# Patient Record
Sex: FEMALE | Race: WHITE | Employment: OTHER | URBAN - METROPOLITAN AREA
[De-identification: names, ages, dates, MRNs, and addresses within clinical notes are randomized per-mention and may not be internally consistent; named-entity substitution may affect disease eponyms.]

---

## 2017-03-15 PROBLEM — S72.141A CLOSED INTERTROCHANTERIC FRACTURE OF RIGHT FEMUR (HCC): Status: ACTIVE | Noted: 2017-03-15

## 2017-04-06 ENCOUNTER — OFFICE VISIT (OUTPATIENT)
Dept: ORTHOPEDIC SURGERY | Age: 82
End: 2017-04-06

## 2017-04-06 ENCOUNTER — TELEPHONE (OUTPATIENT)
Dept: ORTHOPEDIC SURGERY | Age: 82
End: 2017-04-06

## 2017-04-06 VITALS
HEART RATE: 75 BPM | DIASTOLIC BLOOD PRESSURE: 74 MMHG | WEIGHT: 141.98 LBS | SYSTOLIC BLOOD PRESSURE: 121 MMHG | HEIGHT: 67 IN | BODY MASS INDEX: 22.28 KG/M2

## 2017-04-06 DIAGNOSIS — M25.551 HIP PAIN, RIGHT: Primary | ICD-10-CM

## 2017-04-06 DIAGNOSIS — S72.141A CLOSED INTERTROCHANTERIC FRACTURE OF RIGHT FEMUR, INITIAL ENCOUNTER (HCC): ICD-10-CM

## 2017-04-06 PROCEDURE — 99024 POSTOP FOLLOW-UP VISIT: CPT | Performed by: ORTHOPAEDIC SURGERY

## 2017-04-06 PROCEDURE — 73502 X-RAY EXAM HIP UNI 2-3 VIEWS: CPT | Performed by: ORTHOPAEDIC SURGERY

## 2017-04-06 RX ORDER — OXYCODONE HYDROCHLORIDE AND ACETAMINOPHEN 5; 325 MG/1; MG/1
1 TABLET ORAL EVERY 8 HOURS PRN
Qty: 30 TABLET | Refills: 0 | Status: SHIPPED | OUTPATIENT
Start: 2017-04-06 | End: 2017-04-13

## 2017-05-04 ENCOUNTER — OFFICE VISIT (OUTPATIENT)
Dept: ORTHOPEDIC SURGERY | Age: 82
End: 2017-05-04

## 2017-05-04 DIAGNOSIS — S72.141S CLOSED INTERTROCHANTERIC FRACTURE OF RIGHT FEMUR, SEQUELA: ICD-10-CM

## 2017-05-04 DIAGNOSIS — M25.551 HIP PAIN, RIGHT: Primary | ICD-10-CM

## 2017-05-04 PROCEDURE — 99024 POSTOP FOLLOW-UP VISIT: CPT | Performed by: ORTHOPAEDIC SURGERY

## 2017-05-04 PROCEDURE — 73502 X-RAY EXAM HIP UNI 2-3 VIEWS: CPT | Performed by: ORTHOPAEDIC SURGERY

## 2017-05-25 PROBLEM — R42 VERTIGO: Status: ACTIVE | Noted: 2017-05-25

## 2017-06-15 ENCOUNTER — OFFICE VISIT (OUTPATIENT)
Dept: ORTHOPEDIC SURGERY | Age: 82
End: 2017-06-15

## 2017-06-15 VITALS — HEIGHT: 67 IN | WEIGHT: 141.98 LBS | BODY MASS INDEX: 22.28 KG/M2

## 2017-06-15 DIAGNOSIS — S72.141D CLOSED INTERTROCHANTERIC FRACTURE OF RIGHT FEMUR, WITH ROUTINE HEALING, SUBSEQUENT ENCOUNTER: ICD-10-CM

## 2017-06-15 DIAGNOSIS — M25.551 RIGHT HIP PAIN: Primary | ICD-10-CM

## 2017-06-15 PROCEDURE — 73502 X-RAY EXAM HIP UNI 2-3 VIEWS: CPT | Performed by: ORTHOPAEDIC SURGERY

## 2017-06-15 PROCEDURE — 99024 POSTOP FOLLOW-UP VISIT: CPT | Performed by: ORTHOPAEDIC SURGERY

## 2018-08-26 ENCOUNTER — HOSPITAL ENCOUNTER (INPATIENT)
Age: 83
LOS: 4 days | Discharge: HOME HEALTH | DRG: 391 | End: 2018-08-31
Attending: EMERGENCY MEDICINE | Admitting: INTERNAL MEDICINE
Payer: MEDICARE

## 2018-08-26 ENCOUNTER — APPOINTMENT (OUTPATIENT)
Dept: GENERAL RADIOLOGY | Age: 83
DRG: 391 | End: 2018-08-26
Payer: MEDICARE

## 2018-08-26 DIAGNOSIS — E86.0 DEHYDRATION: Primary | ICD-10-CM

## 2018-08-26 DIAGNOSIS — E87.1 HYPONATREMIA: ICD-10-CM

## 2018-08-26 PROBLEM — R53.1 GENERAL WEAKNESS: Status: ACTIVE | Noted: 2018-08-26

## 2018-08-26 LAB
ALBUMIN SERPL-MCNC: 3.8 G/DL (ref 3.4–5)
ALP BLD-CCNC: 62 U/L (ref 40–129)
ALT SERPL-CCNC: 15 U/L (ref 10–40)
ANION GAP SERPL CALCULATED.3IONS-SCNC: 13 MMOL/L (ref 3–16)
ANION GAP SERPL CALCULATED.3IONS-SCNC: 9 MMOL/L (ref 3–16)
APTT: 29 SEC (ref 26–36)
AST SERPL-CCNC: 28 U/L (ref 15–37)
BACTERIA: ABNORMAL /HPF
BASOPHILS ABSOLUTE: 0 K/UL (ref 0–0.2)
BASOPHILS RELATIVE PERCENT: 0.2 %
BILIRUB SERPL-MCNC: 0.5 MG/DL (ref 0–1)
BILIRUBIN DIRECT: <0.2 MG/DL (ref 0–0.3)
BILIRUBIN URINE: NEGATIVE
BILIRUBIN, INDIRECT: NORMAL MG/DL (ref 0–1)
BLOOD, URINE: ABNORMAL
BUN BLDV-MCNC: 17 MG/DL (ref 7–20)
BUN BLDV-MCNC: 17 MG/DL (ref 7–20)
CALCIUM SERPL-MCNC: 8.3 MG/DL (ref 8.3–10.6)
CALCIUM SERPL-MCNC: 9.2 MG/DL (ref 8.3–10.6)
CASTS: ABNORMAL /LPF
CHLORIDE BLD-SCNC: 86 MMOL/L (ref 99–110)
CHLORIDE BLD-SCNC: 91 MMOL/L (ref 99–110)
CLARITY: CLEAR
CO2: 23 MMOL/L (ref 21–32)
CO2: 24 MMOL/L (ref 21–32)
COLOR: YELLOW
CREAT SERPL-MCNC: 1.1 MG/DL (ref 0.6–1.2)
CREAT SERPL-MCNC: 1.3 MG/DL (ref 0.6–1.2)
EOSINOPHILS ABSOLUTE: 0 K/UL (ref 0–0.6)
EOSINOPHILS RELATIVE PERCENT: 0 %
EPITHELIAL CELLS, UA: ABNORMAL /HPF
GFR AFRICAN AMERICAN: 47
GFR AFRICAN AMERICAN: 58
GFR NON-AFRICAN AMERICAN: 39
GFR NON-AFRICAN AMERICAN: 48
GLUCOSE BLD-MCNC: 107 MG/DL (ref 70–99)
GLUCOSE BLD-MCNC: 96 MG/DL (ref 70–99)
GLUCOSE URINE: NEGATIVE MG/DL
HCT VFR BLD CALC: 35.9 % (ref 36–48)
HEMOGLOBIN: 12.9 G/DL (ref 12–16)
INR BLD: 1.42 (ref 0.86–1.14)
KETONES, URINE: NEGATIVE MG/DL
LACTIC ACID: 1.5 MMOL/L (ref 0.4–2)
LEUKOCYTE ESTERASE, URINE: NEGATIVE
LIPASE: 89 U/L (ref 13–60)
LYMPHOCYTES ABSOLUTE: 0.4 K/UL (ref 1–5.1)
LYMPHOCYTES RELATIVE PERCENT: 8.2 %
MCH RBC QN AUTO: 33.1 PG (ref 26–34)
MCHC RBC AUTO-ENTMCNC: 36 G/DL (ref 31–36)
MCV RBC AUTO: 91.9 FL (ref 80–100)
MICROSCOPIC EXAMINATION: YES
MONOCYTES ABSOLUTE: 0.1 K/UL (ref 0–1.3)
MONOCYTES RELATIVE PERCENT: 3.4 %
NEUTROPHILS ABSOLUTE: 3.9 K/UL (ref 1.7–7.7)
NEUTROPHILS RELATIVE PERCENT: 88.2 %
NITRITE, URINE: NEGATIVE
PDW BLD-RTO: 14.1 % (ref 12.4–15.4)
PH UA: 5.5
PLATELET # BLD: 159 K/UL (ref 135–450)
PMV BLD AUTO: 7.6 FL (ref 5–10.5)
POTASSIUM REFLEX MAGNESIUM: 3.9 MMOL/L (ref 3.5–5.1)
POTASSIUM REFLEX MAGNESIUM: 4.1 MMOL/L (ref 3.5–5.1)
PROTEIN UA: ABNORMAL MG/DL
PROTHROMBIN TIME: 16.2 SEC (ref 9.8–13)
RBC # BLD: 3.91 M/UL (ref 4–5.2)
RBC UA: ABNORMAL /HPF (ref 0–2)
SODIUM BLD-SCNC: 122 MMOL/L (ref 136–145)
SODIUM BLD-SCNC: 124 MMOL/L (ref 136–145)
SPECIFIC GRAVITY UA: <=1.005
TOTAL PROTEIN: 6.9 G/DL (ref 6.4–8.2)
TROPONIN: <0.01 NG/ML
URINE TYPE: ABNORMAL
UROBILINOGEN, URINE: 0.2 E.U./DL
WBC # BLD: 4.4 K/UL (ref 4–11)
WBC UA: ABNORMAL /HPF (ref 0–5)

## 2018-08-26 PROCEDURE — 93010 ELECTROCARDIOGRAM REPORT: CPT | Performed by: INTERNAL MEDICINE

## 2018-08-26 PROCEDURE — 93005 ELECTROCARDIOGRAM TRACING: CPT | Performed by: PHYSICIAN ASSISTANT

## 2018-08-26 PROCEDURE — 80048 BASIC METABOLIC PNL TOTAL CA: CPT

## 2018-08-26 PROCEDURE — 2580000003 HC RX 258: Performed by: PHYSICIAN ASSISTANT

## 2018-08-26 PROCEDURE — 80076 HEPATIC FUNCTION PANEL: CPT

## 2018-08-26 PROCEDURE — G0378 HOSPITAL OBSERVATION PER HR: HCPCS

## 2018-08-26 PROCEDURE — 85025 COMPLETE CBC W/AUTO DIFF WBC: CPT

## 2018-08-26 PROCEDURE — 85610 PROTHROMBIN TIME: CPT

## 2018-08-26 PROCEDURE — 96361 HYDRATE IV INFUSION ADD-ON: CPT

## 2018-08-26 PROCEDURE — 85730 THROMBOPLASTIN TIME PARTIAL: CPT

## 2018-08-26 PROCEDURE — 2580000003 HC RX 258: Performed by: INTERNAL MEDICINE

## 2018-08-26 PROCEDURE — 36415 COLL VENOUS BLD VENIPUNCTURE: CPT

## 2018-08-26 PROCEDURE — 99285 EMERGENCY DEPT VISIT HI MDM: CPT

## 2018-08-26 PROCEDURE — 71045 X-RAY EXAM CHEST 1 VIEW: CPT

## 2018-08-26 PROCEDURE — 81001 URINALYSIS AUTO W/SCOPE: CPT

## 2018-08-26 PROCEDURE — 6370000000 HC RX 637 (ALT 250 FOR IP): Performed by: INTERNAL MEDICINE

## 2018-08-26 PROCEDURE — 6360000002 HC RX W HCPCS: Performed by: PHYSICIAN ASSISTANT

## 2018-08-26 PROCEDURE — 84484 ASSAY OF TROPONIN QUANT: CPT

## 2018-08-26 PROCEDURE — 83605 ASSAY OF LACTIC ACID: CPT

## 2018-08-26 PROCEDURE — 83690 ASSAY OF LIPASE: CPT

## 2018-08-26 RX ORDER — HYDROXYUREA 500 MG/1
500 CAPSULE ORAL DAILY
Status: CANCELLED | OUTPATIENT
Start: 2018-08-27

## 2018-08-26 RX ORDER — SODIUM CHLORIDE 0.9 % (FLUSH) 0.9 %
10 SYRINGE (ML) INJECTION EVERY 12 HOURS SCHEDULED
Status: DISCONTINUED | OUTPATIENT
Start: 2018-08-26 | End: 2018-08-31 | Stop reason: HOSPADM

## 2018-08-26 RX ORDER — 0.9 % SODIUM CHLORIDE 0.9 %
1000 INTRAVENOUS SOLUTION INTRAVENOUS ONCE
Status: COMPLETED | OUTPATIENT
Start: 2018-08-26 | End: 2018-08-26

## 2018-08-26 RX ORDER — TRAMADOL HYDROCHLORIDE 50 MG/1
50 TABLET ORAL EVERY 6 HOURS PRN
Status: DISCONTINUED | OUTPATIENT
Start: 2018-08-26 | End: 2018-08-31 | Stop reason: HOSPADM

## 2018-08-26 RX ORDER — SODIUM CHLORIDE 9 MG/ML
INJECTION, SOLUTION INTRAVENOUS CONTINUOUS
Status: DISPENSED | OUTPATIENT
Start: 2018-08-26 | End: 2018-08-27

## 2018-08-26 RX ORDER — GABAPENTIN 600 MG/1
600 TABLET ORAL 2 TIMES DAILY
Status: CANCELLED | OUTPATIENT
Start: 2018-08-26

## 2018-08-26 RX ORDER — SODIUM CHLORIDE 0.9 % (FLUSH) 0.9 %
10 SYRINGE (ML) INJECTION PRN
Status: DISCONTINUED | OUTPATIENT
Start: 2018-08-26 | End: 2018-08-31 | Stop reason: HOSPADM

## 2018-08-26 RX ORDER — GABAPENTIN 300 MG/1
600 CAPSULE ORAL 2 TIMES DAILY
Status: DISCONTINUED | OUTPATIENT
Start: 2018-08-26 | End: 2018-08-31 | Stop reason: HOSPADM

## 2018-08-26 RX ORDER — ONDANSETRON 2 MG/ML
4 INJECTION INTRAMUSCULAR; INTRAVENOUS ONCE
Status: COMPLETED | OUTPATIENT
Start: 2018-08-26 | End: 2018-08-26

## 2018-08-26 RX ORDER — HYDROXYUREA 500 MG/1
500 CAPSULE ORAL DAILY
Status: DISCONTINUED | OUTPATIENT
Start: 2018-08-27 | End: 2018-08-31 | Stop reason: HOSPADM

## 2018-08-26 RX ORDER — TRAMADOL HYDROCHLORIDE 50 MG/1
50 TABLET ORAL 2 TIMES DAILY PRN
Status: CANCELLED | OUTPATIENT
Start: 2018-08-26

## 2018-08-26 RX ORDER — HYDROXYUREA 500 MG/1
500 CAPSULE ORAL DAILY
COMMUNITY

## 2018-08-26 RX ORDER — ONDANSETRON 2 MG/ML
4 INJECTION INTRAMUSCULAR; INTRAVENOUS EVERY 6 HOURS PRN
Status: DISCONTINUED | OUTPATIENT
Start: 2018-08-26 | End: 2018-08-31 | Stop reason: HOSPADM

## 2018-08-26 RX ADMIN — SODIUM CHLORIDE: 9 INJECTION, SOLUTION INTRAVENOUS at 16:00

## 2018-08-26 RX ADMIN — GABAPENTIN 600 MG: 300 CAPSULE ORAL at 21:09

## 2018-08-26 RX ADMIN — SODIUM CHLORIDE 1000 ML: 9 INJECTION, SOLUTION INTRAVENOUS at 12:47

## 2018-08-26 RX ADMIN — SODIUM CHLORIDE 1000 ML: 9 INJECTION, SOLUTION INTRAVENOUS at 11:56

## 2018-08-26 RX ADMIN — APIXABAN 5 MG: 5 TABLET, FILM COATED ORAL at 17:12

## 2018-08-26 RX ADMIN — ONDANSETRON 4 MG: 2 SOLUTION INTRAMUSCULAR; INTRAVENOUS at 11:56

## 2018-08-26 NOTE — ED PROVIDER NOTES
Emergency Department Provider Note     Location: [address] [place] [address]  ED  8/26/2018    I independently performed a history and physical on Wyatt Small 429. All diagnostic, treatment, and disposition decisions were made by myself in conjunction with the mid-level provider. Briefly, this is a 81 y.o. female here for fatigue, on chemo pill for essential thrombocytosis, having fever at home. Sees Dr. Dhruv Hussein, on eliquis, hx DVT, had some N/V/D this week as well, w/ sick contacts. No SOB, no cough/sputum. ED Triage Vitals [08/26/18 1116]   BP Temp Temp src Pulse Resp SpO2 Height Weight   96/61 97.6 °F (36.4 °C) -- 86 14 95 % -- 146 lb (66.2 kg)        Exam showed no acute distress, appears fatigued, A&Ox4, heart RRR, no M/G/R, lungs CTAB, resp. Nonlabored, no abd tenderness, no peritoneal signs/no rebound/no guarding, no focal neuro deficits    EKG interpreted by me shows:  normal sinus rhythm with a rate of 81  Axis is   Left axis deviation  QTc is  within an acceptable range  Intervals and Durations are unremarkable. ST Segments: normal  No significant change from prior EKG dated 3-15-17      For further details of [address] [place] [address] emergency department encounter, please see JOSELUIS Leung's documentation. I reviewed labs/imaging, discussed w/ patient, I do not believe the CXR represents PNA, no resp. Sx, afebrile here, potentially dehydrated from recent GI illness, hypovolemic, BP a little low, given fluids. zofran improved nausea. UA still pending but no urinary sx. PA spoke to hospitalist team for admission. Pt verbalized understanding of plan to stay in hospital.  Clinical Impression:  1. Dehydration    2. Hyponatremia      Disposition:  Patient admitted to the hospital in stable condition.     This chart was generated in part by using Dragon Dictation system and may contain errors related to that system including errors in grammar, punctuation, and spelling, as well as words and phrases

## 2018-08-26 NOTE — ED PROVIDER NOTES
[facility]  ED  eMERGENCY dEPARTMENT eNCOUnter        Pt Name: Regino Da Silva  MRN: [id number]  [place] 1935  Date of evaluation: 8/26/2018  Provider: JOSELUIS Hernandez  PCP: Pili Jamil MD  ED Attending: No att. providers found    [address] [place] [address]       Chief Complaint   Patient presents with    Fatigue    Fever     on chemo pill    Diarrhea    Emesis       HISTORY OF PRESENT ILLNESS   (Location/Symptom, Timing/Onset, Context/Setting, Quality, Duration, Modifying Factors, Severity)  Note limiting factors. Regino Da Silva is a 81 y.o. female presents to emergency department for evaluation of fatigue and weakness nausea vomiting and diarrhea over the course the last several days having recently been changed on her outpatient chemotherapy regimen for her essential thrombocytosis. Patient is managed by oncology under the care of Dr. Amna Murray and now states over the last several days that she's had increased weakness fatigue and inability to maintain her daily activities. Patient states that she's had no significant chest pain or shortness of breath she denies abdominal pain dysuria or other change in her bowel or bladder habits complains only of 6 out of 10 onset of weakness fatigue some intermittent dizziness as well as febrile illness that she states she's had a T-max of 102 with intermittent elevated temperatures without other radiation aggravating or alleviating factors ×2-3 days    Nursing Notes were all reviewed and agreed with or any disagreements were addressed  in the HPI. REVIEW OF SYSTEMS    (2-9 systems for level 4, 10 or more for level 5)     Review of Systems  History of weakness fatigue and febrile illness T-max 102 as above in the setting of known history of thrombocytosis currently on oral chemotherapy  Positives and Pertinent negatives as per HPI. Except as noted above in the ROS, all other systems were reviewed and negative.        PAST MEDICAL HISTORY     Past Medical History:   Diagnosis Date    Arthritis     [facility] [address]     [facility] of blood vessel ([facility] [address] [facility]     Depression     DVT (deep venous thrombosis) ([facility] [address] [facility]     Hypertension          SURGICAL HISTORY       Past Surgical History:   Procedure Laterality Date    CATARACT REMOVAL WITH IMPLANT Right 3/25/14    EYE SURGERY Left 5/1/14    cataract    HIP SURGERY Right 03/15/2017    TFN    HYSTERECTOMY           CURRENT MEDICATIONS       Previous Medications    APIXABAN (ELIQUIS PO)    Take by mouth    GABAPENTIN (NEURONTIN) 600 MG TABLET    Take 600 mg by mouth 2 times daily. 1/2 tablet at noon. 1 tablet at night    HYDROXYUREA (HYDREA) 500 MG CHEMO CAPSULE    Take 1,000 mg by mouth daily    LISINOPRIL (PRINIVIL;ZESTRIL) 10 MG TABLET    Take 1 tablet by mouth daily    MECLIZINE (ANTIVERT) 25 MG TABLET    Take 25 mg by mouth 3 times daily as needed for Dizziness    TRAMADOL (ULTRAM) 50 MG TABLET    Take 50 mg by mouth every 6 hours as needed for Pain         ALLERGIES     Patient has no known allergies. FAMILY HISTORY       Family History   Problem Relation Age of Onset    Arthritis Other     High Blood Pressure Other           SOCIAL HISTORY       Social History     Social History    Marital status:       Spouse name: N/A    Number of children: N/A    Years of education: N/A     Occupational History    housewife      Social History Main Topics    Smoking status: Never Smoker    Smokeless tobacco: Never Used    Alcohol use No    Drug use: No    Sexual activity: No     Other Topics Concern    None     Social History Narrative    None       SCREENINGS    [place] Coma Scale  Eye Opening: Spontaneous  Best Verbal Response: Oriented  Best Motor Response: Obeys commands  [place] Coma Scale Score: 15        PHYSICAL EXAM    (up to 7 for level 4, 8 or more for level 5)     ED Triage Vitals [08/26/18 1116]   BP Temp Temp src Pulse Resp SpO2 Height Weight   96/61 97.6 °F (36.4 °C) -- 86 14 which is new since the prior study. Left basilar airspace disease, atelectasis or pneumonia. No results found. PROCEDURES   Unless otherwise noted below, none     Procedures    CRITICAL CARE TIME   N/A    CONSULTS:  IP CONSULT TO HOSPITALIST      EMERGENCY DEPARTMENT COURSE and DIFFERENTIAL DIAGNOSIS/MDM:   Vitals:    Vitals:    08/26/18 1116 08/26/18 1246   BP: 96/61 108/63   Pulse: 86 78   Resp: 14 16   Temp: 97.6 °F (36.4 °C)    SpO2: 95% 93%   Weight: 146 lb (66.2 kg)        Patient was given the following medications:  Medications   0.9 % sodium chloride bolus (1,000 mLs Intravenous New Bag 8/26/18 1247)   0.9 % sodium chloride bolus (0 mLs Intravenous Stopped 8/26/18 1247)   ondansetron (ZOFRAN) injection 4 mg (4 mg Intravenous Given 8/26/18 1156)       Patient was evaluated clinically by myself as well as Dr. Rasheed Dos Santos    Laboratory studies demonstrate evidence of hyponatremia with a sodium level of 122 patient was provided 1 L IV crystalloid fluid resuscitation and improved significantly    Chest x-ray demonstrated no evidence of acute intrapulmonary process      Thereafter I spoke with hospital's on-call who agreed to facilitate her admission for further evaluation and treatment    The patient tolerated their visit well. They were seen and evaluated by the attending physician who agreed with the assessment and plan. The patient and / or the family were informed of the results of any tests, a time was given to answer questions, a plan was proposed and they agreed with plan. FINAL IMPRESSION      1. Dehydration    2. Hyponatremia          DISPOSITION/PLAN   DISPOSITION Decision To Admit 08/26/2018 01:24:19 PM      PATIENT REFERRED TO:  No follow-up provider specified. DISCHARGE MEDICATIONS:  New Prescriptions    No medications on file       DISCONTINUED MEDICATIONS:  Discontinued Medications    ALENDRONATE (FOSAMAX) 70 MG TABLET    Take 70 mg by mouth every 7 days.       CALCIUM CARBONATE (OSCAL) 500 MG TABS TABLET    Take 500 mg by mouth daily. VITAMIN D (CHOLECALCIFEROL) 1000 UNITS CAPS CAPSULE    Take 1,000 Units by mouth daily. VITAMIN E 400 UNIT CAPSULE    Take 400 Units by mouth daily.               (Please note that portions of this note were completed with a voice recognition program.  Efforts were made to edit the dictations but occasionally words are mis-transcribed.)    JOSELUIS Haider (electronically signed)           JOSELUIS Haider  08/26/18 4320

## 2018-08-26 NOTE — H&P
08/26/18   1146   WBC  4.4   HGB  12.9   HCT  35.9*   PLT  159     Recent Labs      08/26/18   1146   NA  122*   K  3.9   CL  86*   CO2  23   BUN  17   CREATININE  1.3*   CALCIUM  9.2     Recent Labs      08/26/18   1146   AST  28   ALT  15   BILIDIR  <0.2   BILITOT  0.5   ALKPHOS  62     Recent Labs      08/26/18   1146   INR  1.42*     Recent Labs      08/26/18   1146   TROPONINI  <0.01       Urinalysis:      Lab Results   Component Value Date    NITRU Negative 05/07/2015    WBCUA 10-20 05/07/2015    RBCUA None seen 05/07/2015    BLOODU Negative 05/07/2015    SPECGRAV 1.010 05/07/2015    GLUCOSEU Negative 05/07/2015       Radiology:       EKG:  I have reviewed the EKG with the following interpretation: sinus, rate of 81, nl axis, no new acute ischemic changes appreciated    XR CHEST PORTABLE   Final Result   Elevation of the left hemidiaphragm which is new since the prior study. Left basilar airspace disease, atelectasis or pneumonia. ASSESSMENT:    Active Hospital Problems    Diagnosis Date Noted    General weakness [R53.1] 08/26/2018         PLAN:    Fatigue- likely hypovolemia from n/v/diarrhea,  Suspect viral etiology lydia given sick contacts  -Supportive care given  -ivfs ordered  -Consider Pt/ot eval in AM if still weak  -UA pending    Hyponatremia -122 on admission, likely hypovolemic  q6 sodium for 24 hrs, goal of 132 at 1330pm on 8/27  Ns  50/hr x 15 hrs  I/os monitored    Prior dvt-eliquis continued    Essential thrombocytosis- improved  -continued hydrea    HTN- stable to low normal  Hold acei for now, resume on dc    chronic leg pain- neurontin, tramadol continued    DVT Prophylaxis: lovenox  Diet: General  Code Status:FULL    PT/OT Eval Status: not ordered    Dispo - pending improvement in labs, likely Monday       Rigoberto Teague MD    Thank you Tapan Valencia MD for the opportunity to be involved in this patient's care.  If you have any questions or concerns please feel free

## 2018-08-27 PROBLEM — E87.1 HYPONATREMIA: Status: ACTIVE | Noted: 2018-08-27

## 2018-08-27 LAB
ANION GAP SERPL CALCULATED.3IONS-SCNC: 10 MMOL/L (ref 3–16)
ANION GAP SERPL CALCULATED.3IONS-SCNC: 13 MMOL/L (ref 3–16)
ANION GAP SERPL CALCULATED.3IONS-SCNC: 9 MMOL/L (ref 3–16)
BUN BLDV-MCNC: 11 MG/DL (ref 7–20)
BUN BLDV-MCNC: 12 MG/DL (ref 7–20)
BUN BLDV-MCNC: 15 MG/DL (ref 7–20)
CALCIUM SERPL-MCNC: 8.2 MG/DL (ref 8.3–10.6)
CALCIUM SERPL-MCNC: 8.3 MG/DL (ref 8.3–10.6)
CALCIUM SERPL-MCNC: 8.8 MG/DL (ref 8.3–10.6)
CHLORIDE BLD-SCNC: 93 MMOL/L (ref 99–110)
CHLORIDE BLD-SCNC: 93 MMOL/L (ref 99–110)
CHLORIDE BLD-SCNC: 95 MMOL/L (ref 99–110)
CO2: 20 MMOL/L (ref 21–32)
CO2: 21 MMOL/L (ref 21–32)
CO2: 23 MMOL/L (ref 21–32)
CREAT SERPL-MCNC: 0.7 MG/DL (ref 0.6–1.2)
CREAT SERPL-MCNC: 0.8 MG/DL (ref 0.6–1.2)
CREAT SERPL-MCNC: 1 MG/DL (ref 0.6–1.2)
GFR AFRICAN AMERICAN: >60
GFR NON-AFRICAN AMERICAN: 53
GFR NON-AFRICAN AMERICAN: >60
GFR NON-AFRICAN AMERICAN: >60
GLUCOSE BLD-MCNC: 102 MG/DL (ref 70–99)
GLUCOSE BLD-MCNC: 112 MG/DL (ref 70–99)
GLUCOSE BLD-MCNC: 99 MG/DL (ref 70–99)
POTASSIUM REFLEX MAGNESIUM: 3.6 MMOL/L (ref 3.5–5.1)
POTASSIUM REFLEX MAGNESIUM: 4.3 MMOL/L (ref 3.5–5.1)
POTASSIUM SERPL-SCNC: 3.7 MMOL/L (ref 3.5–5.1)
SODIUM BLD-SCNC: 125 MMOL/L (ref 136–145)
SODIUM BLD-SCNC: 126 MMOL/L (ref 136–145)
SODIUM BLD-SCNC: 126 MMOL/L (ref 136–145)

## 2018-08-27 PROCEDURE — 2580000003 HC RX 258: Performed by: INTERNAL MEDICINE

## 2018-08-27 PROCEDURE — 36415 COLL VENOUS BLD VENIPUNCTURE: CPT

## 2018-08-27 PROCEDURE — 6370000000 HC RX 637 (ALT 250 FOR IP): Performed by: INTERNAL MEDICINE

## 2018-08-27 PROCEDURE — 80048 BASIC METABOLIC PNL TOTAL CA: CPT

## 2018-08-27 PROCEDURE — 87040 BLOOD CULTURE FOR BACTERIA: CPT

## 2018-08-27 PROCEDURE — 1200000000 HC SEMI PRIVATE

## 2018-08-27 PROCEDURE — 96374 THER/PROPH/DIAG INJ IV PUSH: CPT

## 2018-08-27 RX ORDER — ACETAMINOPHEN 325 MG/1
650 TABLET ORAL EVERY 4 HOURS PRN
Status: DISCONTINUED | OUTPATIENT
Start: 2018-08-27 | End: 2018-08-31 | Stop reason: HOSPADM

## 2018-08-27 RX ORDER — LISINOPRIL 10 MG/1
10 TABLET ORAL DAILY
Status: DISCONTINUED | OUTPATIENT
Start: 2018-08-27 | End: 2018-08-31 | Stop reason: HOSPADM

## 2018-08-27 RX ADMIN — APIXABAN 5 MG: 5 TABLET, FILM COATED ORAL at 20:25

## 2018-08-27 RX ADMIN — ACETAMINOPHEN 650 MG: 325 TABLET, FILM COATED ORAL at 19:12

## 2018-08-27 RX ADMIN — SODIUM CHLORIDE, PRESERVATIVE FREE 10 ML: 5 INJECTION INTRAVENOUS at 09:33

## 2018-08-27 RX ADMIN — GABAPENTIN 600 MG: 300 CAPSULE ORAL at 20:25

## 2018-08-27 RX ADMIN — APIXABAN 5 MG: 5 TABLET, FILM COATED ORAL at 09:33

## 2018-08-27 RX ADMIN — HYDROXYUREA 500 MG: 500 CAPSULE ORAL at 09:33

## 2018-08-27 RX ADMIN — GABAPENTIN 600 MG: 300 CAPSULE ORAL at 09:33

## 2018-08-27 RX ADMIN — LISINOPRIL 10 MG: 10 TABLET ORAL at 19:16

## 2018-08-27 RX ADMIN — SODIUM CHLORIDE, PRESERVATIVE FREE 10 ML: 5 INJECTION INTRAVENOUS at 20:25

## 2018-08-27 ASSESSMENT — PAIN SCALES - GENERAL: PAINLEVEL_OUTOF10: 0

## 2018-08-27 NOTE — PLAN OF CARE
Problem: DAILY CARE  Goal: Daily care needs are met  Outcome: Ongoing      Problem: Infection:  Goal: Will remain free from infection  Will remain free from infection  Outcome: Ongoing      Problem: Safety:  Goal: Free from accidental physical injury  Free from accidental physical injury  Outcome: Ongoing

## 2018-08-27 NOTE — FLOWSHEET NOTE
Pt A&Ox4. VSS. Assessment updated and charted. Denies pain or other needs. Assist x 1 with the walker to the bathroom. Call light within reach, bed in lowest position, wheels locked. Bed alarm on and audible. Will monitor.

## 2018-08-27 NOTE — PROGRESS NOTES
 General weakness [R53.1] 08/26/2018     PLAN:     Fatigue and generalized weakness- likely hypovolemia and hyponatremia from n/v/diarrhea,   Etiology possible viral etiology lydia given sick contacts. Concern for possible side effects of hydrea causing fever, chills and overall weakness  -Supportive care given  -ivfs discontinued  - Pt/ot eval   -UA Negative  -Blood clx ordered 8/27  -Consult Heme/Onc for assistance with hydrea.     Hyponatremia -Stable neuro function. 122 on admission, likely hypovolemic  -q6 sodium for 24 hrs  -NS  50/hr x 15 hrs Completed in 8/27 AM  - I/os monitored     LE  dvt-Stable clot and no new etiology or symtpoms  - Eliquis continued     Essential thrombocytosis- improved with stable platelet count  -continued hydrea for now.   Await Heme/Onc recommendations.     Essential HTN- Improving with fluid IV resuscitation  - Restart acei Lisinopril 10 mg daily     chronic leg pain- Stable  - Meds: neurontin, tramadol continued     DVT Prophylaxis: lovenox  Diet: General  Code Status:FULL   PT/OT Eval Status: ordered     Dispo - Expect 1-2 days       Christy Brownlee MD

## 2018-08-27 NOTE — PROGRESS NOTES
Pt c/o chills, and shivering. VSS. Warm blanket and heat packs applied. Pt resting quietly with family at bedside.

## 2018-08-28 LAB
ANION GAP SERPL CALCULATED.3IONS-SCNC: 9 MMOL/L (ref 3–16)
BACTERIA: ABNORMAL /HPF
BASOPHILS ABSOLUTE: 0 K/UL (ref 0–0.2)
BASOPHILS RELATIVE PERCENT: 0.3 %
BILIRUBIN URINE: NEGATIVE
BLOOD, URINE: ABNORMAL
BUN BLDV-MCNC: 10 MG/DL (ref 7–20)
CALCIUM SERPL-MCNC: 8.8 MG/DL (ref 8.3–10.6)
CHLORIDE BLD-SCNC: 89 MMOL/L (ref 99–110)
CLARITY: CLEAR
CO2: 26 MMOL/L (ref 21–32)
COLOR: YELLOW
CREAT SERPL-MCNC: 0.8 MG/DL (ref 0.6–1.2)
EOSINOPHILS ABSOLUTE: 0 K/UL (ref 0–0.6)
EOSINOPHILS RELATIVE PERCENT: 0 %
EPITHELIAL CELLS, UA: ABNORMAL /HPF
GFR AFRICAN AMERICAN: >60
GFR NON-AFRICAN AMERICAN: >60
GLUCOSE BLD-MCNC: 125 MG/DL (ref 70–99)
GLUCOSE URINE: NEGATIVE MG/DL
HCT VFR BLD CALC: 32 % (ref 36–48)
HEMOGLOBIN: 11.2 G/DL (ref 12–16)
KETONES, URINE: NEGATIVE MG/DL
LEUKOCYTE ESTERASE, URINE: NEGATIVE
LYMPHOCYTES ABSOLUTE: 0.4 K/UL (ref 1–5.1)
LYMPHOCYTES RELATIVE PERCENT: 7.4 %
MCH RBC QN AUTO: 32.7 PG (ref 26–34)
MCHC RBC AUTO-ENTMCNC: 35 G/DL (ref 31–36)
MCV RBC AUTO: 93.4 FL (ref 80–100)
MICROSCOPIC EXAMINATION: YES
MONOCYTES ABSOLUTE: 0.3 K/UL (ref 0–1.3)
MONOCYTES RELATIVE PERCENT: 4.8 %
NEUTROPHILS ABSOLUTE: 4.9 K/UL (ref 1.7–7.7)
NEUTROPHILS RELATIVE PERCENT: 87.5 %
NITRITE, URINE: NEGATIVE
PDW BLD-RTO: 15.7 % (ref 12.4–15.4)
PH UA: 6.5
PLATELET # BLD: 182 K/UL (ref 135–450)
PMV BLD AUTO: 8.1 FL (ref 5–10.5)
POTASSIUM SERPL-SCNC: 4.1 MMOL/L (ref 3.5–5.1)
PROTEIN UA: 30 MG/DL
RBC # BLD: 3.43 M/UL (ref 4–5.2)
RBC UA: ABNORMAL /HPF (ref 0–2)
RENAL EPITHELIAL, UA: ABNORMAL /HPF
SODIUM BLD-SCNC: 124 MMOL/L (ref 136–145)
SPECIFIC GRAVITY UA: 1.01
URINE TYPE: ABNORMAL
UROBILINOGEN, URINE: 0.2 E.U./DL
WBC # BLD: 5.6 K/UL (ref 4–11)
WBC UA: ABNORMAL /HPF (ref 0–5)

## 2018-08-28 PROCEDURE — G8979 MOBILITY GOAL STATUS: HCPCS

## 2018-08-28 PROCEDURE — 1200000000 HC SEMI PRIVATE

## 2018-08-28 PROCEDURE — G8987 SELF CARE CURRENT STATUS: HCPCS

## 2018-08-28 PROCEDURE — 2580000003 HC RX 258: Performed by: INTERNAL MEDICINE

## 2018-08-28 PROCEDURE — 97116 GAIT TRAINING THERAPY: CPT

## 2018-08-28 PROCEDURE — 85025 COMPLETE CBC W/AUTO DIFF WBC: CPT

## 2018-08-28 PROCEDURE — 97535 SELF CARE MNGMENT TRAINING: CPT

## 2018-08-28 PROCEDURE — 36415 COLL VENOUS BLD VENIPUNCTURE: CPT

## 2018-08-28 PROCEDURE — 80048 BASIC METABOLIC PNL TOTAL CA: CPT

## 2018-08-28 PROCEDURE — G8988 SELF CARE GOAL STATUS: HCPCS

## 2018-08-28 PROCEDURE — 97530 THERAPEUTIC ACTIVITIES: CPT

## 2018-08-28 PROCEDURE — 6370000000 HC RX 637 (ALT 250 FOR IP): Performed by: INTERNAL MEDICINE

## 2018-08-28 PROCEDURE — 6360000002 HC RX W HCPCS: Performed by: INTERNAL MEDICINE

## 2018-08-28 PROCEDURE — 87086 URINE CULTURE/COLONY COUNT: CPT

## 2018-08-28 PROCEDURE — G8978 MOBILITY CURRENT STATUS: HCPCS

## 2018-08-28 PROCEDURE — 81001 URINALYSIS AUTO W/SCOPE: CPT

## 2018-08-28 PROCEDURE — 97161 PT EVAL LOW COMPLEX 20 MIN: CPT

## 2018-08-28 PROCEDURE — 97165 OT EVAL LOW COMPLEX 30 MIN: CPT

## 2018-08-28 RX ORDER — SODIUM CHLORIDE 9 MG/ML
INJECTION, SOLUTION INTRAVENOUS CONTINUOUS
Status: DISCONTINUED | OUTPATIENT
Start: 2018-08-28 | End: 2018-08-31 | Stop reason: HOSPADM

## 2018-08-28 RX ADMIN — ACETAMINOPHEN 650 MG: 325 TABLET, FILM COATED ORAL at 20:07

## 2018-08-28 RX ADMIN — HYDROXYUREA 500 MG: 500 CAPSULE ORAL at 08:52

## 2018-08-28 RX ADMIN — APIXABAN 5 MG: 5 TABLET, FILM COATED ORAL at 08:52

## 2018-08-28 RX ADMIN — PIPERACILLIN AND TAZOBACTAM 3.38 G: 3; .375 INJECTION, POWDER, FOR SOLUTION INTRAVENOUS at 16:16

## 2018-08-28 RX ADMIN — PIPERACILLIN AND TAZOBACTAM 3.38 G: 3; .375 INJECTION, POWDER, FOR SOLUTION INTRAVENOUS at 23:34

## 2018-08-28 RX ADMIN — LISINOPRIL 10 MG: 10 TABLET ORAL at 08:52

## 2018-08-28 RX ADMIN — SODIUM CHLORIDE, PRESERVATIVE FREE 10 ML: 5 INJECTION INTRAVENOUS at 08:52

## 2018-08-28 RX ADMIN — GABAPENTIN 600 MG: 300 CAPSULE ORAL at 20:07

## 2018-08-28 RX ADMIN — GABAPENTIN 600 MG: 300 CAPSULE ORAL at 08:52

## 2018-08-28 RX ADMIN — APIXABAN 5 MG: 5 TABLET, FILM COATED ORAL at 20:07

## 2018-08-28 RX ADMIN — ONDANSETRON 4 MG: 2 SOLUTION INTRAMUSCULAR; INTRAVENOUS at 13:25

## 2018-08-28 RX ADMIN — SODIUM CHLORIDE: 9 INJECTION, SOLUTION INTRAVENOUS at 15:09

## 2018-08-28 ASSESSMENT — PAIN SCALES - GENERAL
PAINLEVEL_OUTOF10: 0

## 2018-08-28 NOTE — PROGRESS NOTES
Hospitalist Progress Note      PCP: Deep Ralph MD    Date of Admission: 8/26/2018    Chief Complaint on Admission: Fatigue/weakness    History Of Present Illness:       81 y.o. female with htn, essential thrombocytosis who presented to [facility] with fatigue/weakness. Pt has been on hydrea for 1 month and recently reduced dose this week. Pt was exposed to sick family contacts who had GI bug(n/v/diarrhea) and she developed symptoms on Wednesday evening with n/v/diarrhea. She also noted fever of 102. Family gave her pedialyte/water/gatorate to help hydrate, however pt continued to have signif diarrhea until Friday. She noted fatigue/weakness thereafter and so came in for evaluation. No URI symptoms.     ER course: noted hyponatremia, low normal bp, given ivf bolus    Pt Seen/Examined and Chart Reviewed. Admitting dx: Hyponatrmia    SUBJECTIVE:   States continuing to feel weak and has been \"shivering and shaking\" more than her usual.  Has recently been started on Hydrea for Thrombocytosis. Recently titrated to 500 mg daily. States she has tolerated this well. Her sx of N/V and diarrhea have improved. Appetite returning and eating more on her meal trays. Remains febrile: T max 102.9. Unclear focus to these fevers. Has been using 3-4 blankets to keep herself warm. Denies: CP, SOB, Abd pain, N/V/D, dysuria, HA, skin lesions      No new medical complaints  Bedside rounding with nursing completed. OBJECTIVE:     Allergies  Patient has no known allergies.     Medications      Scheduled Meds:   lisinopril  10 mg Oral Daily    gabapentin  600 mg Oral BID    apixaban  5 mg Oral BID    hydroxyurea  500 mg Oral Daily    sodium chloride flush  10 mL Intravenous 2 times per day       Infusions:      PRN Meds:  acetaminophen, traMADol, sodium chloride flush, magnesium hydroxide, ondansetron    Intake and Output     Intake/Output Summary (Last 24 hours) at 08/28/18 7771  Last data filed at 08/28/18 1300   Gross per 24 hour   Intake              360 ml   Output             2350 ml   Net            -1990 ml       Vitals    BP (!) 155/79   Pulse 95   Temp 101.6 °F (38.7 °C) (Oral)   Resp 16   Ht 5' 8\" (1.727 m)   Wt 146 lb 3.2 oz (66.3 kg)   SpO2 94%   BMI 22.23 kg/m²     Exam:  General appearance:  No apparent distress, appears stated age and cooperative. Elderly appearing  HEENT:  Normal cephalic, atraumatic without obvious deformity. Pupils equal, round, and reactive to light. Extra ocular muscles intact. Conjunctivae/corneas clear. Neck: Supple, with full range of motion. No jugular venous distention. Trachea midline. Respiratory:  Normal respiratory effort. Clear to auscultation, bilaterally without Rales/Wheezes/Rhonchi. Cardiovascular:  Regular rate and rhythm with normal S1/S2 without murmurs, rubs or gallops. Abdomen: Soft, non-tender, non-distended with normal bowel sounds. Musculoskeletal:  No clubbing, cyanosis or edema bilaterally. Intentional motor tremors on walking or moving her arms. No resting tremor at rest.  Gait unbalanced with small stride. Skin: Skin color, texture, turgor normal.  No rashes or lesions. Neurologic:  Neurovascularly intact without any focal sensory/motor deficits.  Cranial nerves: II-XII intact, grossly non-focal.  Psychiatric:  Alert and oriented, thought content appropriate, normal insight  Capillary Refill: Brisk,< 3 seconds   Peripheral Pulses: +2 palpable, equal bilaterally   Data    Recent Labs      08/26/18   1146   WBC  4.4   HGB  12.9   HCT  35.9*   PLT  159      Recent Labs      08/26/18   2356  08/27/18   0613  08/27/18   1732   NA  125*  126*  126*   K  4.3  3.6  3.7   CL  93*  95*  93*   CO2  23  21  20*   BUN  15  11  12   CREATININE  1.0  0.7  0.8     Recent Labs      08/26/18   1146   AST  28   ALT  15   BILIDIR  <0.2   BILITOT  0.5   ALKPHOS  62     Recent Labs      08/26/18   1146   INR  1.42*     Recent Labs      08/26/18   1146

## 2018-08-28 NOTE — PLAN OF CARE
Problem: SAFETY  Goal: Free from accidental physical injury  Outcome: Ongoing      Problem: DAILY CARE  Goal: Daily care needs are met  Outcome: Ongoing      Problem: Infection:  Goal: Will remain free from infection  Will remain free from infection   Outcome: Ongoing

## 2018-08-28 NOTE — CONSULTS
Hematology/Oncology Consultation         Patient:   Matt Woods       Reason for Consult:  81year-old female with ET admitted with gastroenteritis  Requesting Physician: Dr. Catherine Santana    Chief Complaint:     Chief Complaint   Patient presents with    Fatigue    Fever     on chemo pill    Diarrhea    Emesis                  History Obtained from:     patient    History of Present Illness:     Matt Woods is a 81 y.o. female  with significant past medical history of DVT x 5 and ET who presents with fatigue/weakness. Last wed she had a fever to 102 deg F associated with nausea, vomiting, diarrhea. Family had the same illness. Her GI symptoms improved by Friday, but she was fatigued and was seen in the ER and admitted on 8/27. She has ET. She was started on Hydrea on 7/18/2018. Titrated down to 500 mg daily on 8/21. PLTs were 277 K/mcL at that time. She is on Eliquis for DVT x 5. Currently, she reports  That she has not vomited since yesterday. She is weak but tells me that she is walking with effort but was in a wheelchair on admission.     Past Medical History:         Diagnosis Date    Arthritis     [facility] [address]     Cancer of blood vessel ([facility] [address]     Depression     DVT (deep venous thrombosis) ([facility] [address]     Hypertension        Past Surgical History:         Procedure Laterality Date    CATARACT REMOVAL WITH IMPLANT Right 3/25/14    EYE SURGERY Left 5/1/14    cataract    HIP SURGERY Right 03/15/2017    TFN    HYSTERECTOMY         Current Medications:     Current Facility-Administered Medications   Medication Dose Route Frequency Provider Last Rate Last Dose    lisinopril (PRINIVIL;ZESTRIL) tablet 10 mg  10 mg Oral Daily Deanna Pitt MD   10 mg at 08/27/18 1916    acetaminophen (TYLENOL) tablet 650 mg  650 mg Oral Q4H PRN Jen Bower MD   650 mg at 08/27/18 1912    gabapentin (NEURONTIN) capsule 600 mg  600 mg Oral BID Pancho Thompson MD   600 mg at 08/27/18 2025    apixaban (ELIQUIS) tablet 5 mg  5 mg Oral BID Erica Magana MD   5 mg at 08/27/18 2025    hydroxyurea (HYDREA) chemo capsule 500 mg  500 mg Oral Daily Erica Magana MD   500 mg at 08/27/18 0933    traMADol (ULTRAM) tablet 50 mg  50 mg Oral Q6H PRN Erica Magana MD        sodium chloride flush 0.9 % injection 10 mL  10 mL Intravenous 2 times per day Erica Magana MD   10 mL at 08/27/18 2025    sodium chloride flush 0.9 % injection 10 mL  10 mL Intravenous PRN Erica Magana MD        magnesium hydroxide (MILK OF MAGNESIA) 400 MG/5ML suspension 30 mL  30 mL Oral Daily PRN Erica Magana MD        ondansetron [facility]) injection 4 mg  4 mg Intravenous Q6H PRN Erica Magana MD           Allergies:     No Known Allergies    Social History:     Social History     Social History    Marital status:      Spouse name: N/A    Number of children: N/A    Years of education: N/A     Occupational History    housewife      Social History Main Topics    Smoking status: Never Smoker    Smokeless tobacco: Never Used    Alcohol use No    Drug use: No    Sexual activity: No     Other Topics Concern    Not on file     Social History Narrative    No narrative on file     History   Drug Use No     History   Alcohol Use No     History   Sexual Activity    Sexual activity: No     History   Smoking Status    Never Smoker   Smokeless Tobacco    Never Used       Family History:     Family History   Problem Relation Age of Onset    Arthritis Other     High Blood Pressure Other        Review of Systems:     Constitutional: Denies fever, sweats, weight loss. .     Eyes: No visual changes or diplopia. No scleral icterus. ENT: No Headaches, no hearing loss, no  vertigo. No mouth sores or sore throat. Cardiovascular: No chest pain, no dyspnea on exertion, no palpitations, no  loss of consciousness. Respiratory: No cough, no  wheezing, no dyspnea, no sputum production.  No hemoptysis. .    Gastrointestinal: No abdominal pain, no appetite loss, no blood in stools. No change in bowel habits. Genitourinary: No dysuria, trouble voiding, or hematuria. Musculoskeletal:  Generalized weakness. No joint complaints. Integumentary: No rash or pruritis. Neurological: No headache, diplopia. No change in gait, balance, or coordination. No paresthesias. Endocrine: No temperature intolerance. No excessive thirst, fluid intake, or urination. Hematologic/Lymphatic: No abnormal bruising or ecchymoses, no blood clots or swollen lymph nodes. Allergic/Immunologic: No nasal congestion or hives. Physical Exam:     /65   Pulse 81   Temp 98 °F (36.7 °C)   Resp 16   Ht 5' 8\" (1.727 m)   Wt 146 lb 3.2 oz (66.3 kg)   SpO2 94%   BMI 22.23 kg/m²     CONSTITUTIONAL: awake, alert, cooperative, no apparent distress. EYES:  Pupils equal, round and reactive to light, sclera non-icteric, conjunctiva normal  ENT:  Normocephalic, without obvious abnormality, atraumatic, sinuses nontender on palpation, external ears without lesions, oral pharynx with moist mucus membranes, no mucositis. NECK:  Supple, symmetrical, trachea midline, no adenopathy, thyroid symmetric, not enlarged and no tenderness, skin normal  HEMATOLOGIC/LYMPHATICS:  no cervical lymphadenopathy, no supraclavicular lymphadenopathy, no axillary lymphadenopathy and no inguinal lymphadenopathy  BACK:  Symmetric, no curvature, spinous processes are non-tender on palpation, paraspinous muscles are non-tender on palpation, no costal vertebral tenderness  LUNGS:  Clear to auscultation bilaterally, no crackles or wheezing  CARDIOVASCULAR:  Regular rate and rhythm, normal S1 and S2, no S3 or S4, and no murmur noted  ABDOMEN:  Normal bowel sounds, soft, non-distended, non-tender, no masses palpated, no hepatosplenomegally  MUSCULOSKELETAL:  There is no redness, warmth, or swelling of the joints  NEUROLOGIC:   No focal findings.    SKIN:

## 2018-08-28 NOTE — PROGRESS NOTES
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  Leisure & Hobbies: Cooking for grandchildren, spending time with family, [employer], going to [employer] study       Objective        Orientation  Overall Orientation Status: Within Functional Limits  Observation/Palpation  Posture: Good  Balance  Sitting Balance: Supervision  Standing Balance: Contact guard assistance  Standing Balance  Sit to stand: Contact guard assistance  Stand to sit: Stand by assistance    Functional Mobility  Functional - Mobility Device: No device (gait belt)  Activity: To/from bathroom  Assist Level: Stand by assistance    Toilet Transfers  Toilet - Technique: Ambulating  Equipment Used: Standard toilet  Toilet Transfer: Stand by assistance    ADL  LE Dressing: Contact guard assistance  Toileting: Supervision     Tone RUE  RUE Tone: Normotonic  Tone LUE  LUE Tone: Normotonic  Coordination  Movements Are Fluid And Coordinated: Yes     Bed mobility  Supine to Sit: Supervision (HOB elevated)  Sit to Supine: Unable to assess  Transfers  Sit to stand: Contact guard assistance  Stand to sit: Stand by assistance     Cognition  Overall Cognitive Status: WFL        Sensation  Overall Sensation Status: Impaired  Light Touch: Partial deficits in the RLE;Partial deficits in the LLE (c/o mild numbness in BLE distal to mid-calf due to baseline neuropathy)        LUE AROM (degrees)  LUE AROM : WFL  RUE AROM (degrees)  RUE AROM : WFL     LUE Strength  Gross LUE Strength: WFL  RUE Strength  Gross RUE Strength: WFL        Assessment   Performance deficits / Impairments: Decreased functional mobility ; Decreased ADL status; Decreased balance  After evaluation, pt found to be presenting with the above mentioned occupational performance deficits which are affecting participation in daily living skills.  Pt would benefit from continued skilled occupational therapy to address ADLs,

## 2018-08-28 NOTE — PROGRESS NOTES
Physical Therapy    Facility/Department: [facility] C3 TELE/MED SURG/ONC  Initial Assessment    NAME: Mabel Mcdaniels  : 1935  MRN: [id number]    Date of Service: 2018    Discharge Recommendations:  Home with assist PRN   PT Equipment Recommendations  Equipment Needed: No    Patient Diagnosis(es): The primary encounter diagnosis was Dehydration. A diagnosis of Hyponatremia was also pertinent to this visit. has a past medical history of Arthritis; Cancer ([facility] [address] Cancer of blood vessel ([facility] [address] Depression; DVT (deep venous thrombosis) ([facility] [address] and Hypertension. has a past surgical history that includes Hysterectomy; Cataract removal with implant (Right, 3/25/14); eye surgery (Left, 14); and hip surgery (Right, 03/15/2017). Restrictions  Restrictions/Precautions  Restrictions/Precautions: General Precautions, Contact Precautions  Position Activity Restriction  Other position/activity restrictions: Medium fall risk per nursing assessment, chemo safe handling precautions, up with assistance  Vision/Hearing  Vision: Impaired  Vision Exceptions: Wears glasses for reading  Hearing: Within functional limits     Subjective  General  Chart Reviewed: Yes  Patient assessed for rehabilitation services?: Yes  Family / Caregiver Present: No  Referring Practitioner: Dr. Gene Zamudio  Referral Date : 18  Diagnosis: Fatigue, generalized weakness  Follows Commands: Within Functional Limits  General Comment  Comments: Pt resting in bed upon entry of therapy staff  Subjective  Subjective: Pt agreeable to work with PT/OT this morning. Says she's been walking to/from the bathroom using the walker with assist from nursing. \"My legs don't feel as strong as they should be. \"  Pain Screening  Patient Currently in Pain: Denies  Intervention List: Patient able to continue with treatment    Orientation  Orientation  Overall Orientation Status: Within Functional Limits    Social/Functional History  Social/Functional History  Lives With: Alone  Type of Home: House  Home Layout: One level (no basement level)  Home Access: Stairs to enter with rails (3 JUN)  Entrance Stairs - Rails: Both  Bathroom Shower/Tub: Walk-in shower, Shower chair without back  Bathroom Toilet: Standard  Bathroom Equipment: Shower chair, Hand-held shower, Toilet raiser, Grab bars in shower  Home Equipment: Grab bars, Cane, Rolling walker (BSC)  ADL Assistance: Independent  Homemaking Assistance: Independent  Ambulation Assistance: Independent (using 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  Leisure & Hobbies: Cooking for grandchildren, spending time with family, [employer], going to [employer] study  Objective     Observation/Palpation  Posture: Good    AROM RLE (degrees)  RLE AROM: WFL  AROM LLE (degrees)  LLE AROM : WFL  Strength RLE  Comment: Grossly 4-/5 to 4/5 throughout  Strength LLE  Comment: Grossly 4-/5 to 4/5 throughout     Sensation  Overall Sensation Status: Impaired  Light Touch: Partial deficits in the RLE;Partial deficits in the LLE (c/o mild numbness in BLE distal to mid-calf due to baseline neuropathy)     Bed mobility  Supine to Sit: Supervision (HOB elevated slightly)  Scooting: Supervision (to scoot forward to EOB)     Transfers  Sit to Stand: Contact guard assistance  Stand to sit: Stand by assistance  Bed to Chair: Stand by assistance (using RW)     Ambulation  Surface: level tile  Device: Rolling Walker  Assistance: Contact guard assistance;Stand by assistance (CGA x 1 decreasing to SBA with repetition)  Quality of Gait: Pt amb with slightly decreased ruth and step length compared to baseline, although generally steady while using RW for support. No LOB.   Distance: x 250 feet    Stairs/Curb  # Steps : 4  Stairs Height: 6\"  Rails: Bilateral  Device: No Device  Assistance: Stand by assistance  Comment: Pt amb up/down steps using reciprocal step-over-step pattern with good quad control. No instances of joint instability or LOB when navigating steps. Balance  Posture: Good  Sitting - Static: Good  Sitting - Dynamic: Good  Standing - Static: Good  Standing - Dynamic: Fair;+    Assessment   Body structures, Functions, Activity limitations: Decreased functional mobility ; Decreased balance;Decreased strength;Decreased endurance;Decreased sensation  Assessment: Pt referred for PT evaluation during current hospital stay with dx of fatigue and generalized weakness. Pt currently functioning slightly below her baseline, requiring use of RW for added support with ambulation. Pt demonstrates slight increase in BLE weakness compared to baseline and would benefit from continued PT in acute setting to address strength, balance, and mobility. Anticipate no home PT needs at D/C. Recommend pt return home with assist PRN from family. Treatment Diagnosis: Decreased (I) with mobility, generalized weakness  Specific instructions for Next Treatment: Progress ther ex and mobility as tolerated, progress to gait training with cane as able  Prognosis: Good  Decision Making: Low Complexity  Patient Education: Role of PT, benefits of mobility, safety in transfers/amb with RW, stair amb, D/C recs - pt verbalizes understanding  REQUIRES PT FOLLOW UP: Yes  Activity Tolerance  Activity Tolerance: Patient Tolerated treatment well  Activity Tolerance: No c/o dizziness or pain with ambulation. Plan   Plan  Times per week: 3-5x/week in acute care  Times per day: Daily  Specific instructions for Next Treatment: Progress ther ex and mobility as tolerated, progress to gait training with cane as able  Current Treatment Recommendations: Strengthening, Balance Training, Endurance Training, Functional Mobility Training, Transfer Training, Gait Training, Stair training, Safety Education & Training, Equipment Evaluation, Education, & procurement  Safety Devices  Type of devices:  All fall risk precautions in place, Left in chair, Call light within reach, Nurse notified, Gait belt    G-Code  PT G-Codes  Functional Assessment Tool Used: AM-[facility] Mobility Inpatient  Score: 20  Functional Limitation: Mobility: Walking and moving around  Mobility: Walking and Moving Around Current Status (): At least 20 percent but less than 40 percent impaired, limited or restricted  Mobility: Walking and Moving Around Goal Status (): At least 1 percent but less than 20 percent impaired, limited or restricted    AM-PAC Score  AM-PAC Inpatient Mobility Raw Score : 20  AM-PAC Inpatient T-Scale Score : 47.67  Mobility Inpatient CMS 0-100% Score: 35.83  Mobility Inpatient CMS G-Code Modifier : CJ    Goals  Short term goals  Time Frame for Short term goals: 3-5 days (unless otherwise specified)  Short term goal 1: Pt will transfer supine <-> sit with (I)  Short term goal 2: Pt will transfer sit <-> stand and bed>chair using least AD (cane if possible) with modified(I)  Short term goal 3: Pt will ambulate x 300 feet using least AD (cane if possible) with supervision/modified(I)  Short term goal 4: Pt will ambulate up/down 3 steps using handrail(s) PRN with supervision  Short term goal 5: By 8/29/18: Pt will tolerate 12-15 reps BLE exercise for strengthening, balance, and endurance  Patient Goals   Patient goals :  \"To go home and get back to my exercising\"       Therapy Time   Individual Concurrent Group Co-treatment   Time In 0851         Time Out 0926         Minutes 35         Timed Code Treatment Minutes: [address] [facility], [address], [place] #[id number]

## 2018-08-28 NOTE — PROGRESS NOTES
Discussed with Dr. Kizzy Lin pt's fever and labs. Due to uncertainty about pt's spiking of fever and possibility of this being related to hydrea's side effects advised to hold off drug in AM. Will reevaluate tomorrow.

## 2018-08-29 LAB
ANION GAP SERPL CALCULATED.3IONS-SCNC: 9 MMOL/L (ref 3–16)
BASOPHILS ABSOLUTE: 0 K/UL (ref 0–0.2)
BASOPHILS RELATIVE PERCENT: 0.5 %
BUN BLDV-MCNC: 9 MG/DL (ref 7–20)
CALCIUM SERPL-MCNC: 8.6 MG/DL (ref 8.3–10.6)
CHLORIDE BLD-SCNC: 93 MMOL/L (ref 99–110)
CO2: 25 MMOL/L (ref 21–32)
CREAT SERPL-MCNC: 0.8 MG/DL (ref 0.6–1.2)
EOSINOPHILS ABSOLUTE: 0 K/UL (ref 0–0.6)
EOSINOPHILS RELATIVE PERCENT: 0.2 %
GFR AFRICAN AMERICAN: >60
GFR NON-AFRICAN AMERICAN: >60
GLUCOSE BLD-MCNC: 84 MG/DL (ref 70–99)
HCT VFR BLD CALC: 30.9 % (ref 36–48)
HEMOGLOBIN: 10.9 G/DL (ref 12–16)
LYMPHOCYTES ABSOLUTE: 0.5 K/UL (ref 1–5.1)
LYMPHOCYTES RELATIVE PERCENT: 14.1 %
MCH RBC QN AUTO: 32.8 PG (ref 26–34)
MCHC RBC AUTO-ENTMCNC: 35.1 G/DL (ref 31–36)
MCV RBC AUTO: 93.3 FL (ref 80–100)
MONOCYTES ABSOLUTE: 0.3 K/UL (ref 0–1.3)
MONOCYTES RELATIVE PERCENT: 7.1 %
NEUTROPHILS ABSOLUTE: 3 K/UL (ref 1.7–7.7)
NEUTROPHILS RELATIVE PERCENT: 78.1 %
PDW BLD-RTO: 15.9 % (ref 12.4–15.4)
PLATELET # BLD: 178 K/UL (ref 135–450)
PMV BLD AUTO: 8 FL (ref 5–10.5)
POTASSIUM SERPL-SCNC: 3.8 MMOL/L (ref 3.5–5.1)
RBC # BLD: 3.32 M/UL (ref 4–5.2)
SODIUM BLD-SCNC: 127 MMOL/L (ref 136–145)
WBC # BLD: 3.8 K/UL (ref 4–11)

## 2018-08-29 PROCEDURE — 2580000003 HC RX 258: Performed by: INTERNAL MEDICINE

## 2018-08-29 PROCEDURE — 6360000002 HC RX W HCPCS: Performed by: INTERNAL MEDICINE

## 2018-08-29 PROCEDURE — 80048 BASIC METABOLIC PNL TOTAL CA: CPT

## 2018-08-29 PROCEDURE — 6370000000 HC RX 637 (ALT 250 FOR IP): Performed by: INTERNAL MEDICINE

## 2018-08-29 PROCEDURE — 85025 COMPLETE CBC W/AUTO DIFF WBC: CPT

## 2018-08-29 PROCEDURE — 1200000000 HC SEMI PRIVATE

## 2018-08-29 PROCEDURE — 97116 GAIT TRAINING THERAPY: CPT

## 2018-08-29 PROCEDURE — 36415 COLL VENOUS BLD VENIPUNCTURE: CPT

## 2018-08-29 RX ADMIN — PIPERACILLIN AND TAZOBACTAM 3.38 G: 3; .375 INJECTION, POWDER, FOR SOLUTION INTRAVENOUS at 16:00

## 2018-08-29 RX ADMIN — SODIUM CHLORIDE: 9 INJECTION, SOLUTION INTRAVENOUS at 20:38

## 2018-08-29 RX ADMIN — PIPERACILLIN AND TAZOBACTAM 3.38 G: 3; .375 INJECTION, POWDER, FOR SOLUTION INTRAVENOUS at 10:28

## 2018-08-29 RX ADMIN — HYDROXYUREA 500 MG: 500 CAPSULE ORAL at 10:28

## 2018-08-29 RX ADMIN — GABAPENTIN 600 MG: 300 CAPSULE ORAL at 10:29

## 2018-08-29 RX ADMIN — LISINOPRIL 10 MG: 10 TABLET ORAL at 10:28

## 2018-08-29 RX ADMIN — SODIUM CHLORIDE, PRESERVATIVE FREE 10 ML: 5 INJECTION INTRAVENOUS at 20:39

## 2018-08-29 RX ADMIN — PIPERACILLIN AND TAZOBACTAM 3.38 G: 3; .375 INJECTION, POWDER, FOR SOLUTION INTRAVENOUS at 23:15

## 2018-08-29 RX ADMIN — ACETAMINOPHEN 650 MG: 325 TABLET, FILM COATED ORAL at 20:04

## 2018-08-29 RX ADMIN — GABAPENTIN 600 MG: 300 CAPSULE ORAL at 20:39

## 2018-08-29 RX ADMIN — APIXABAN 5 MG: 5 TABLET, FILM COATED ORAL at 10:28

## 2018-08-29 RX ADMIN — APIXABAN 5 MG: 5 TABLET, FILM COATED ORAL at 20:39

## 2018-08-29 NOTE — PLAN OF CARE
Problem: PAIN  Goal: Patient's pain/discomfort is manageable  Outcome: Ongoing  Pt a/o, rates pain appropriately using 0-10 pain scale; pt calls out as needed for pain intervention; will continue to monitor and administer intervention as ordered and requested. Arsenio Tran

## 2018-08-29 NOTE — PROGRESS NOTES
Patient is alert and oriented;  VSS;  Shift assessment completed; Bed locked and in lowest position. Bedside table and call light within reach; Pt denies further needs. Will continue to monitor.

## 2018-08-29 NOTE — PROGRESS NOTES
care  Times per day: Daily  Specific instructions for Next Treatment: Progress ther ex and mobility as tolerated, progress to gait training with cane as able  Current Treatment Recommendations: Strengthening, Balance Training, Endurance Training, Functional Mobility Training, Transfer Training, Gait Training, Stair training, Safety Education & Training, Equipment Evaluation, Education, & procurement  Safety Devices  Type of devices: Call light within reach, Gait belt, Bed alarm in place, Left in bed, Nurse notified     Therapy Time   Individual Concurrent Group Co-treatment   Time In 1545         Time Out 1600         Minutes [address] [facility], [facility]

## 2018-08-29 NOTE — PROGRESS NOTES
IV in right forearm removed by patient in sleep. New 22 g replaced in right forearm. Will continue to monitor.

## 2018-08-29 NOTE — PROGRESS NOTES
Hematology/Oncology Progress Note       Subjective:     She feels much better today and hopes to go home.  at bedside. ROS:     Constitutional: Denies fever, sweats, weight loss. .     Eyes: No visual changes or diplopia. No scleral icterus. ENT: No Headaches, no hearing loss, no  vertigo. No mouth sores or sore throat. Cardiovascular: No chest pain, no dyspnea on exertion, no palpitations, no  loss of consciousness. Respiratory: No cough, no  wheezing, no dyspnea, no sputum production. No hemoptysis. .    Gastrointestinal: No abdominal pain, no appetite loss, no blood in stools. No change in bowel habits. Genitourinary: No dysuria, trouble voiding, or hematuria. Musculoskeletal:  Generalized weakness. No joint complaints. Integumentary: No rash or pruritis. Neurological: No headache, diplopia. No change in gait, balance, or coordination. No paresthesias. Endocrine: No temperature intolerance. No excessive thirst, fluid intake, or urination. Hematologic/Lymphatic: No abnormal bruising or ecchymoses, no blood clots or swollen lymph nodes. Allergic/Immunologic: No nasal congestion or hives. Objective:     Physical examination:     /83   Pulse 72   Temp 97.9 °F (36.6 °C) (Oral)   Resp 16   Ht 5' 8\" (1.727 m)   Wt 146 lb 3.2 oz (66.3 kg)   SpO2 93%   BMI 22.23 kg/m²     CONSTITUTIONAL: awake, alert, cooperative, no apparent distress. EYES:  Pupils equal, round and reactive to light, sclera non-icteric, conjunctiva normal  ENT:  Normocephalic, without obvious abnormality, atraumatic, sinuses nontender on palpation, external ears without lesions, oral pharynx with moist mucus membranes, no mucositis.   NECK:  Supple, symmetrical, trachea midline, no adenopathy, thyroid symmetric, not enlarged and no tenderness, skin normal  HEMATOLOGIC/LYMPHATICS:  no cervical lymphadenopathy, no supraclavicular lymphadenopathy, no axillary lymphadenopathy and no inguinal lymphadenopathy  BACK: Symmetric, no curvature, spinous processes are non-tender on palpation, paraspinous muscles are non-tender on palpation, no costal vertebral tenderness  LUNGS:  Clear to auscultation bilaterally, no crackles or wheezing  CARDIOVASCULAR:  Regular rate and rhythm, normal S1 and S2, no S3 or S4, and no murmur noted  ABDOMEN:  Normal bowel sounds, soft, non-distended, non-tender, no masses palpated, no hepatosplenomegally  MUSCULOSKELETAL:  There is no redness, warmth, or swelling of the joints  NEUROLOGIC:   No focal findings. SKIN:  Scattered bruising and ecchymoses. EXT: without clubbing, cyanosis or edema.     Data:     CBC:   Recent Labs      08/29/18   0607  08/28/18   1515  08/26/18   1146   WBC  3.8*  5.6  4.4   HGB  10.9*  11.2*  12.9   HCT  30.9*  32.0*  35.9*   MCV  93.3  93.4  91.9   PLT  178  182  159       BMP:   Lab Results   Component Value Date     08/28/2018    K 4.1 08/28/2018    K 3.6 08/27/2018    CO2 26 08/28/2018    BUN 10 08/28/2018    CREATININE 0.8 08/28/2018    CALCIUM 8.8 08/28/2018       HEPATIC:  Lab Results   Component Value Date    AST 28 08/26/2018    ALT 15 08/26/2018    ALKPHOS 62 08/26/2018    PROT 6.9 08/26/2018    BILITOT 0.5 08/26/2018    BILIDIR <0.2 08/26/2018       TUMOR MARKERS:   No results found for: PSA, CEA, , MH8515,     LDH:  No results found for: LDH    PT/INR:  No results found for: PTINR    PTT:  Lab Results   Component Value Date    APTT 29.0 08/26/2018       Current Medications:     Current Facility-Administered Medications: piperacillin-tazobactam (ZOSYN) 3.375 g in dextrose 5 % 50 mL IVPB extended infusion (mini-bag), 3.375 g, Intravenous, Q8H  0.9 % sodium chloride infusion, , Intravenous, Continuous  lisinopril (PRINIVIL;ZESTRIL) tablet 10 mg, 10 mg, Oral, Daily  acetaminophen (TYLENOL) tablet 650 mg, 650 mg, Oral, Q4H PRN  gabapentin (NEURONTIN) capsule 600 mg, 600 mg, Oral, BID  apixaban (ELIQUIS) tablet 5 mg, 5 mg, Oral, BID  hydroxyurea (HYDREA) chemo capsule 500 mg, 500 mg, Oral, Daily  traMADol (ULTRAM) tablet 50 mg, 50 mg, Oral, Q6H PRN  sodium chloride flush 0.9 % injection 10 mL, 10 mL, Intravenous, 2 times per day  sodium chloride flush 0.9 % injection 10 mL, 10 mL, Intravenous, PRN  magnesium hydroxide (MILK OF MAGNESIA) 400 MG/5ML suspension 30 mL, 30 mL, Oral, Daily PRN  ondansetron (ZOFRAN) injection 4 mg, 4 mg, Intravenous, Q6H PRN    Imaging:     None     Impression:     Essential Thrombocytosis    Assessment and Plan:     CBC reviewed. Cont current Hydrea dosing. If her WBC falls further, may consider changing Hydrea to 500 every other day. Okay to dc from a heme standpoint. She has follow up in her [facility] onc office next week for labs. She will keep this appointment. Platelet count is 916P. Case discussed with Dr. Kelechi Denson.      Giorgi Jennings CNP   Medical Oncology/Hematology    [facility] - [address],  [place]   Phone: [phone]  Fax: [phone]

## 2018-08-30 LAB
ANION GAP SERPL CALCULATED.3IONS-SCNC: 8 MMOL/L (ref 3–16)
BASOPHILS ABSOLUTE: 0 K/UL (ref 0–0.2)
BASOPHILS RELATIVE PERCENT: 0.6 %
BUN BLDV-MCNC: 8 MG/DL (ref 7–20)
CALCIUM SERPL-MCNC: 8.4 MG/DL (ref 8.3–10.6)
CHLORIDE BLD-SCNC: 95 MMOL/L (ref 99–110)
CO2: 27 MMOL/L (ref 21–32)
CREAT SERPL-MCNC: 0.8 MG/DL (ref 0.6–1.2)
EOSINOPHILS ABSOLUTE: 0 K/UL (ref 0–0.6)
EOSINOPHILS RELATIVE PERCENT: 0.2 %
GFR AFRICAN AMERICAN: >60
GFR NON-AFRICAN AMERICAN: >60
GLUCOSE BLD-MCNC: 95 MG/DL (ref 70–99)
HCT VFR BLD CALC: 30.7 % (ref 36–48)
HEMOGLOBIN: 10.8 G/DL (ref 12–16)
LYMPHOCYTES ABSOLUTE: 0.5 K/UL (ref 1–5.1)
LYMPHOCYTES RELATIVE PERCENT: 12.8 %
MCH RBC QN AUTO: 32.8 PG (ref 26–34)
MCHC RBC AUTO-ENTMCNC: 35 G/DL (ref 31–36)
MCV RBC AUTO: 93.7 FL (ref 80–100)
MONOCYTES ABSOLUTE: 0.3 K/UL (ref 0–1.3)
MONOCYTES RELATIVE PERCENT: 7.2 %
NEUTROPHILS ABSOLUTE: 3.3 K/UL (ref 1.7–7.7)
NEUTROPHILS RELATIVE PERCENT: 79.2 %
PDW BLD-RTO: 15.3 % (ref 12.4–15.4)
PLATELET # BLD: 208 K/UL (ref 135–450)
PMV BLD AUTO: 7.7 FL (ref 5–10.5)
POTASSIUM SERPL-SCNC: 3.7 MMOL/L (ref 3.5–5.1)
RBC # BLD: 3.27 M/UL (ref 4–5.2)
SODIUM BLD-SCNC: 130 MMOL/L (ref 136–145)
URINE CULTURE, ROUTINE: NORMAL
WBC # BLD: 4.2 K/UL (ref 4–11)

## 2018-08-30 PROCEDURE — 36415 COLL VENOUS BLD VENIPUNCTURE: CPT

## 2018-08-30 PROCEDURE — 1200000000 HC SEMI PRIVATE

## 2018-08-30 PROCEDURE — 97530 THERAPEUTIC ACTIVITIES: CPT

## 2018-08-30 PROCEDURE — 94761 N-INVAS EAR/PLS OXIMETRY MLT: CPT

## 2018-08-30 PROCEDURE — 85025 COMPLETE CBC W/AUTO DIFF WBC: CPT

## 2018-08-30 PROCEDURE — 97116 GAIT TRAINING THERAPY: CPT

## 2018-08-30 PROCEDURE — 97110 THERAPEUTIC EXERCISES: CPT

## 2018-08-30 PROCEDURE — 97535 SELF CARE MNGMENT TRAINING: CPT

## 2018-08-30 PROCEDURE — 2580000003 HC RX 258: Performed by: INTERNAL MEDICINE

## 2018-08-30 PROCEDURE — 6370000000 HC RX 637 (ALT 250 FOR IP): Performed by: INTERNAL MEDICINE

## 2018-08-30 PROCEDURE — 80048 BASIC METABOLIC PNL TOTAL CA: CPT

## 2018-08-30 PROCEDURE — 6360000002 HC RX W HCPCS: Performed by: INTERNAL MEDICINE

## 2018-08-30 RX ADMIN — GABAPENTIN 600 MG: 300 CAPSULE ORAL at 09:37

## 2018-08-30 RX ADMIN — LISINOPRIL 10 MG: 10 TABLET ORAL at 09:37

## 2018-08-30 RX ADMIN — APIXABAN 5 MG: 5 TABLET, FILM COATED ORAL at 20:38

## 2018-08-30 RX ADMIN — PIPERACILLIN AND TAZOBACTAM 3.38 G: 3; .375 INJECTION, POWDER, FOR SOLUTION INTRAVENOUS at 18:19

## 2018-08-30 RX ADMIN — SODIUM CHLORIDE, PRESERVATIVE FREE 10 ML: 5 INJECTION INTRAVENOUS at 20:38

## 2018-08-30 RX ADMIN — SODIUM CHLORIDE: 9 INJECTION, SOLUTION INTRAVENOUS at 20:38

## 2018-08-30 RX ADMIN — PIPERACILLIN AND TAZOBACTAM 3.38 G: 3; .375 INJECTION, POWDER, FOR SOLUTION INTRAVENOUS at 09:37

## 2018-08-30 RX ADMIN — HYDROXYUREA 500 MG: 500 CAPSULE ORAL at 09:37

## 2018-08-30 RX ADMIN — GABAPENTIN 600 MG: 300 CAPSULE ORAL at 20:38

## 2018-08-30 RX ADMIN — APIXABAN 5 MG: 5 TABLET, FILM COATED ORAL at 09:37

## 2018-08-30 RX ADMIN — SODIUM CHLORIDE, PRESERVATIVE FREE 10 ML: 5 INJECTION INTRAVENOUS at 09:37

## 2018-08-30 NOTE — PROGRESS NOTES
Physical Therapy  Facility/Department: [facility] C3 TELE/MED SURG/ONC  Daily Treatment Note  NAME: Hiren Jones  : 1935  MRN: [id number]    Date of Service: 2018    Discharge Recommendations:  Home with assist PRN   PT Equipment Recommendations  Equipment Needed: No    Patient Diagnosis(es): The primary encounter diagnosis was Dehydration. A diagnosis of Hyponatremia was also pertinent to this visit. has a past medical history of Arthritis; Cancer ([facility] [address] Cancer of blood vessel ([facility] [address] Depression; DVT (deep venous thrombosis) ([facility] [address] and Hypertension. has a past surgical history that includes Hysterectomy; Cataract removal with implant (Right, 3/25/14); eye surgery (Left, 14); and hip surgery (Right, 03/15/2017).     Restrictions  Restrictions/Precautions  Restrictions/Precautions: General Precautions, Contact Precautions  Position Activity Restriction  Other position/activity restrictions: Medium fall risk per nursing assessment, chemo safe handling precautions, up with assistance  Subjective   General  Chart Reviewed: Yes  Family / Caregiver Present: No  Referring Practitioner: Dr. Omer Swanson  Subjective  Subjective: Pt agreeable to work with PT  General Comment  Comments: Pt up in chair upon entry of therapy staff  Pain Screening  Patient Currently in Pain: Denies  Vital Signs  Patient Currently in Pain: Denies       Orientation  Orientation  Overall Orientation Status: Within Normal Limits  Objective   Bed mobility  Supine to Sit: Unable to assess  Transfers  Sit to Stand: Contact guard assistance;Stand by assistance  Stand to sit: Contact guard assistance;Stand by assistance  Ambulation  Ambulation?: Yes  Ambulation 1  Surface: level tile  Device: Single point cane  Assistance: Contact guard assistance  Quality of Gait: slightly decreased ruth and step length, no LOB  Distance: 100 ft, 150 ft  Comments:    Stairs/Curb  Stairs?: Yes  Stairs  # Steps : 4  Stairs Height: 6\"  Rails: Bilateral  Device: No Device  Assistance: Supervision  Comment: reciprocal step-over-step pattern with good quad control. No instances of joint instability or LOB when navigating steps. Balance  Posture: Good  Sitting - Static: Good  Sitting - Dynamic: Good  Standing - Static: Good;-  Standing - Dynamic: Fair;+  Exercises  Gluteal Sets: 15  Hip Flexion: 20 B  Hip Abduction: 20 B  Knee Long Arc Quad: 20 B  Ankle Pumps: 20 B       Assessment   Body structures, Functions, Activity limitations: Decreased functional mobility ; Decreased balance;Decreased strength;Decreased endurance;Decreased sensation  Treatment Diagnosis: Decreased (I) with mobility, generalized weakness  Prognosis: Good  Patient Education: Role of PT, benefits of mobility, safety in transfers/amb with RW, stair amb, D/C recs - pt verbalizes understanding  REQUIRES PT FOLLOW UP: Yes  Activity Tolerance  Activity Tolerance: Patient Tolerated treatment well     G-Code  AM-PAC Score  AM-PAC Inpatient Mobility Raw Score : 20  AM-PAC Inpatient T-Scale Score : 47.67  Mobility Inpatient CMS 0-100% Score: 35.83  Mobility Inpatient CMS G-Code Modifier : CJ          Goals  Short term goals  Time Frame for Short term goals: 3-5 days (unless otherwise specified)  Short term goal 1: Pt will transfer supine <-> sit with (I)  Short term goal 2: Pt will transfer sit <-> stand and bed>chair using least AD (cane if possible) with modified(I)  Short term goal 3: Pt will ambulate x 300 feet using least AD (cane if possible) with supervision/modified(I)  Short term goal 4: Pt will ambulate up/down 3 steps using handrail(s) PRN with supervision  Short term goal 5: By 8/29/18: Pt will tolerate 12-15 reps BLE exercise for strengthening, balance, and endurance  Patient Goals   Patient goals :  \"To go home and get back to my exercising\"    Plan    Plan  Times per week: 3-5x/week in acute care  Times per day: Daily  Specific instructions for Next Treatment: Progress ther ex and mobility as tolerated, progress to gait training with cane as able  Current Treatment Recommendations: Strengthening, Balance Training, Endurance Training, Functional Mobility Training, Transfer Training, Gait Training, Stair training, Safety Education & Training, Equipment Evaluation, Education, & procurement  Safety Devices  Type of devices: Call light within reach, Gait belt, Left in bed, Nurse notified, Left in chair (pt declined alarm)     Therapy Time   Individual Concurrent Group Co-treatment   Time In 1338         Time Out 1420         Minutes 42         Timed Code Treatment Minutes: [facility]

## 2018-08-30 NOTE — PROGRESS NOTES
NA  124*  127*  130*   K  4.1  3.8  3.7   CL  89*  93*  95*   CO2  26  25  27   BUN  10  9  8   CREATININE  0.8  0.8  0.8     No results for input(s): AST, ALT, ALB, BILIDIR, BILITOT, ALKPHOS in the last 72 hours. No results for input(s): INR in the last 72 hours. No results for input(s): CKTOTAL, CKMB, CKMBINDEX, TROPONINI in the last 72 hours. Consults:     IP CONSULT TO HOSPITALIST  IP CONSULT TO ONCOLOGY    ASSESSMENT AND PLAN      Active Hospital Problems    Diagnosis Date Noted    Hyponatremia [E87.1] 08/27/2018    General weakness [R53.1] 08/26/2018     PLAN:     Acute fevers, fatigue and generalized weakness- likely hypovolemia and hyponatremia from n/v/diarrhea,   Etiology possible viral etiology lydia given sick contacts. Heme/Onc does not support side effects of hydrea causing fever, chills and overall weakness. CXR with left basilar ASD, ? Pneumonia since patient is not SOB, cough or demonstrate any respiratory symptoms. We will start empiric abx for GI sx that would also cover PNA as well. Fevers have improved while starting antibiotic therapy. - Abx start 8/28 Zosyn  -Supportive care given  - Pt/ot eval   -UA Negative. Urine culture no growth to date  -Blood clx no growth to date  -CXR: Possible L sided ASD  -Consult Heme/Onc: Hydrea not likely causing fevers or fatigue/weakness     Hyponatremia -Stable neuro function. 122 on admission, likely hypovolemic  -q6 sodium for 24 hrs  -NS  75 /hr  - I/os monitored     LE  dvt-Stable clot and no new etiology or symtpoms  - Eliquis continued     Essential thrombocytosis- improved with stable platelet count  -continued hydrea for now.   Await Heme/Onc recommendations.     Essential HTN- Improving with fluid IV resuscitation  - Restart acei Lisinopril 10 mg daily     chronic leg pain- Stable  - Meds: neurontin, tramadol continued     DVT Prophylaxis: lovenox  Diet: General  Code Status:FULL   PT/OT Eval Status: ordered     Dispo - Expect discharge in a. m.8/31 pending fever, antibiotic control and stable sodium levels       Herb Spaulding MD

## 2018-08-31 VITALS
DIASTOLIC BLOOD PRESSURE: 78 MMHG | OXYGEN SATURATION: 93 % | BODY MASS INDEX: 22.16 KG/M2 | HEIGHT: 68 IN | SYSTOLIC BLOOD PRESSURE: 129 MMHG | WEIGHT: 146.2 LBS | TEMPERATURE: 98.1 F | HEART RATE: 76 BPM | RESPIRATION RATE: 18 BRPM

## 2018-08-31 PROBLEM — R50.9 FEVER: Status: ACTIVE | Noted: 2018-08-31

## 2018-08-31 PROBLEM — R53.1 GENERAL WEAKNESS: Status: RESOLVED | Noted: 2018-08-26 | Resolved: 2018-08-31

## 2018-08-31 PROBLEM — K52.9 GASTROENTERITIS: Status: ACTIVE | Noted: 2018-08-31

## 2018-08-31 LAB
ANION GAP SERPL CALCULATED.3IONS-SCNC: 11 MMOL/L (ref 3–16)
BASOPHILS ABSOLUTE: 0 K/UL (ref 0–0.2)
BASOPHILS RELATIVE PERCENT: 0.8 %
BUN BLDV-MCNC: 6 MG/DL (ref 7–20)
CALCIUM SERPL-MCNC: 8.6 MG/DL (ref 8.3–10.6)
CHLORIDE BLD-SCNC: 91 MMOL/L (ref 99–110)
CO2: 25 MMOL/L (ref 21–32)
CREAT SERPL-MCNC: 0.8 MG/DL (ref 0.6–1.2)
EOSINOPHILS ABSOLUTE: 0 K/UL (ref 0–0.6)
EOSINOPHILS RELATIVE PERCENT: 0.4 %
GFR AFRICAN AMERICAN: >60
GFR NON-AFRICAN AMERICAN: >60
GLUCOSE BLD-MCNC: 94 MG/DL (ref 70–99)
HCT VFR BLD CALC: 31 % (ref 36–48)
HEMOGLOBIN: 11.1 G/DL (ref 12–16)
LYMPHOCYTES ABSOLUTE: 0.7 K/UL (ref 1–5.1)
LYMPHOCYTES RELATIVE PERCENT: 19.3 %
MCH RBC QN AUTO: 33.2 PG (ref 26–34)
MCHC RBC AUTO-ENTMCNC: 35.7 G/DL (ref 31–36)
MCV RBC AUTO: 93.1 FL (ref 80–100)
MONOCYTES ABSOLUTE: 0.2 K/UL (ref 0–1.3)
MONOCYTES RELATIVE PERCENT: 7.4 %
NEUTROPHILS ABSOLUTE: 2.4 K/UL (ref 1.7–7.7)
NEUTROPHILS RELATIVE PERCENT: 72.1 %
PDW BLD-RTO: 15 % (ref 12.4–15.4)
PLATELET # BLD: 240 K/UL (ref 135–450)
PMV BLD AUTO: 7.6 FL (ref 5–10.5)
POTASSIUM SERPL-SCNC: 3.7 MMOL/L (ref 3.5–5.1)
RBC # BLD: 3.33 M/UL (ref 4–5.2)
SODIUM BLD-SCNC: 127 MMOL/L (ref 136–145)
WBC # BLD: 3.4 K/UL (ref 4–11)

## 2018-08-31 PROCEDURE — 36415 COLL VENOUS BLD VENIPUNCTURE: CPT

## 2018-08-31 PROCEDURE — 2580000003 HC RX 258: Performed by: INTERNAL MEDICINE

## 2018-08-31 PROCEDURE — 6360000002 HC RX W HCPCS: Performed by: INTERNAL MEDICINE

## 2018-08-31 PROCEDURE — 6370000000 HC RX 637 (ALT 250 FOR IP): Performed by: INTERNAL MEDICINE

## 2018-08-31 PROCEDURE — 85025 COMPLETE CBC W/AUTO DIFF WBC: CPT

## 2018-08-31 PROCEDURE — 80048 BASIC METABOLIC PNL TOTAL CA: CPT

## 2018-08-31 PROCEDURE — 97116 GAIT TRAINING THERAPY: CPT

## 2018-08-31 PROCEDURE — 97110 THERAPEUTIC EXERCISES: CPT

## 2018-08-31 RX ORDER — AMOXICILLIN AND CLAVULANATE POTASSIUM 875; 125 MG/1; MG/1
1 TABLET, FILM COATED ORAL 2 TIMES DAILY
Qty: 14 TABLET | Refills: 0 | Status: SHIPPED | OUTPATIENT
Start: 2018-08-31 | End: 2018-09-07

## 2018-08-31 RX ADMIN — PIPERACILLIN AND TAZOBACTAM 3.38 G: 3; .375 INJECTION, POWDER, FOR SOLUTION INTRAVENOUS at 08:03

## 2018-08-31 RX ADMIN — LISINOPRIL 10 MG: 10 TABLET ORAL at 08:02

## 2018-08-31 RX ADMIN — HYDROXYUREA 500 MG: 500 CAPSULE ORAL at 08:02

## 2018-08-31 RX ADMIN — APIXABAN 5 MG: 5 TABLET, FILM COATED ORAL at 08:01

## 2018-08-31 RX ADMIN — PIPERACILLIN AND TAZOBACTAM 3.38 G: 3; .375 INJECTION, POWDER, FOR SOLUTION INTRAVENOUS at 01:51

## 2018-08-31 RX ADMIN — GABAPENTIN 600 MG: 300 CAPSULE ORAL at 08:02

## 2018-08-31 ASSESSMENT — PAIN SCALES - GENERAL
PAINLEVEL_OUTOF10: 3
PAINLEVEL_OUTOF10: 0

## 2018-08-31 NOTE — CARE COORDINATION
[facility]    Referral received from CM. I will continue to follow patient for needs, and arrange [place] [id number] services prior to DC. Should pt dc over the weekend please fax facesheet, order, KITA, and H&P to [facility].      Phil Del Rio RN CTN with [facility]  EVC:[phone]

## 2018-08-31 NOTE — CARE COORDINATION
CASE MANAGEMENT DISCHARGE SUMMARY      Discharge to: home with [address] ordered/agency: none    Transportation: private   Family/car:       Notified: patient aware   Family:    Facility/Agency: KITA/AVS faxed per [facility]   RN: Duglas Pizano      Note: Discharging nurse to complete KITA, reconcile AVS, and place final copy with patient's discharge packet. RN to ensure that written prescriptions for  Level II medications are sent with patient to the facility as per protocol.       Jenny Moe RN

## 2018-08-31 NOTE — PROGRESS NOTES
Physical Therapy  Facility/Department: [facility] C3 TELE/MED SURG/ONC  Daily Treatment Note  NAME: Tani Craig  : 1935  MRN: [id number]    Date of Service: 2018    Discharge Recommendations:  24 hour supervision or assist, Home with Home health PT   PT Equipment Recommendations  Equipment Needed: No    Patient Diagnosis(es): The primary encounter diagnosis was Dehydration. A diagnosis of Hyponatremia was also pertinent to this visit. has a past medical history of Arthritis; Cancer ([facility] [address] Cancer of blood vessel ([facility] [address] Depression; DVT (deep venous thrombosis) ([facility] [address] and Hypertension. has a past surgical history that includes Hysterectomy; Cataract removal with implant (Right, 3/25/14); eye surgery (Left, 14); and hip surgery (Right, 03/15/2017).     Restrictions  Restrictions/Precautions  Restrictions/Precautions: General Precautions, Contact Precautions  Position Activity Restriction  Other position/activity restrictions: Medium fall risk per nursing assessment, chemo safe handling precautions, up with assistance     Subjective   General  Chart Reviewed: Yes  Family / Caregiver Present: No  Referring Practitioner: Dr. Collins Bell  Subjective  Subjective: Pt agreeable to work with PT  Pain Screening  Patient Currently in Pain: Denies  Vital Signs  Patient Currently in Pain: Denies       Orientation  Orientation  Overall Orientation Status: Within Normal Limits     Objective   Bed mobility  Supine to Sit: Supervision  Sit to Supine: Unable to assess  Transfers  Sit to Stand: Stand by assistance;Supervision  Stand to sit: Stand by assistance;Supervision  Ambulation  Ambulation?: Yes  Ambulation 1  Surface: level tile  Device: Rolling Walker  Assistance: Contact guard assistance;Stand by assistance  Quality of Gait: slightly decreased ruth and step length, no LOB  Distance: 400'  Comments: Pt has RW at home, but states [facility] does not like to use it because it is inconvienent she had 2 LOB with PT on Wednesday and 1 near fall last night with RN. Used RW this ambulation trial and encouraged pt to use RW at home for safety, also encouraged life alert. Pt agrees to use RW but refused life alert. Exercises  Gluteal Sets: 15  Hip Flexion: 20 B  Hip Abduction: 20 B  Knee Long Arc Quad: 20 B  Ankle Pumps: 20 B HR/TR                        Assessment   Body structures, Functions, Activity limitations: Decreased functional mobility ; Decreased balance;Decreased strength;Decreased endurance;Decreased sensation  Treatment Diagnosis: Decreased (I) with mobility, generalized weakness  Specific instructions for Next Treatment: Progress ther ex and mobility as tolerated, progress to gait training with cane as able  Prognosis: Good  Patient Education: Role of PT, benefits of mobility, safety in transfers/amb with RW, stair amb, D/C recs - pt verbalizes understanding  REQUIRES PT FOLLOW UP: Yes  Activity Tolerance  Activity Tolerance: Patient Tolerated treatment well            AM-PAC Score     AM-PAC Inpatient Mobility without Stair Climbing Raw Score : 17  AM-PAC Inpatient without Stair Climbing T-Scale Score : 48.47  Mobility Inpatient CMS 0-100% Score: 32.72  Mobility Inpatient without Stair CMS G-Code Modifier : CJ       Goals  Short term goals  Time Frame for Short term goals: 3-5 days (unless otherwise specified)  Short term goal 1: Pt will transfer supine <-> sit with (I)  Short term goal 2: Pt will transfer sit <-> stand and bed>chair using least AD (cane if possible) with modified(I)  Short term goal 3: Pt will ambulate x 300 feet using least AD (cane if possible) with supervision/modified(I)  Short term goal 4: Pt will ambulate up/down 3 steps using handrail(s) PRN with supervision  Short term goal 5: By 8/29/18: Pt will tolerate 12-15 reps BLE exercise for strengthening, balance, and endurance  Patient Goals   Patient goals :  \"To go home and get back to my exercising\"    Plan    Plan  Times per week: 3-5x/week

## 2018-08-31 NOTE — CARE COORDINATION
Spoke with patients RN. States patient had almost fallen last night and is now open to [facility]. Spoke with patient is ok with referral to any agency. Referral made to [facility]. Rn stated she will message MD with request for [facility] order and to sign domi. Patient speaking with family on phone to arrange transportation home.  Kaye Nelson RN

## 2018-09-02 LAB
BLOOD CULTURE, ROUTINE: NORMAL
CULTURE, BLOOD 2: NORMAL

## 2018-09-03 ENCOUNTER — HOSPITAL ENCOUNTER (OUTPATIENT)
Age: 83
Discharge: HOME | End: 2018-09-03
Payer: MEDICARE

## 2018-09-03 LAB
ANION GAP SERPL CALCULATED.3IONS-SCNC: 12 MMOL/L (ref 3–16)
BUN BLDV-MCNC: 10 MG/DL (ref 7–20)
CALCIUM SERPL-MCNC: 9.3 MG/DL (ref 8.3–10.6)
CHLORIDE BLD-SCNC: 91 MMOL/L (ref 99–110)
CO2: 25 MMOL/L (ref 21–32)
CREAT SERPL-MCNC: 0.7 MG/DL (ref 0.6–1.2)
EKG ATRIAL RATE: 81 BPM
EKG DIAGNOSIS: NORMAL
EKG P AXIS: 38 DEGREES
EKG P-R INTERVAL: 188 MS
EKG Q-T INTERVAL: 362 MS
EKG QRS DURATION: 92 MS
EKG QTC CALCULATION (BAZETT): 420 MS
EKG R AXIS: -31 DEGREES
EKG T AXIS: 28 DEGREES
EKG VENTRICULAR RATE: 81 BPM
GFR AFRICAN AMERICAN: >60
GFR NON-AFRICAN AMERICAN: >60
GLUCOSE BLD-MCNC: 90 MG/DL (ref 70–99)
POTASSIUM SERPL-SCNC: 3.8 MMOL/L (ref 3.5–5.1)
SODIUM BLD-SCNC: 128 MMOL/L (ref 136–145)

## 2018-09-03 PROCEDURE — 36415 COLL VENOUS BLD VENIPUNCTURE: CPT

## 2018-09-03 PROCEDURE — 80048 BASIC METABOLIC PNL TOTAL CA: CPT

## 2018-09-12 ENCOUNTER — HOSPITAL ENCOUNTER (OUTPATIENT)
Age: 83
Setting detail: SPECIMEN
Discharge: HOME | End: 2018-09-12
Payer: MEDICARE

## 2018-09-12 LAB
ANION GAP SERPL CALCULATED.3IONS-SCNC: 11 MMOL/L (ref 3–16)
BUN BLDV-MCNC: 10 MG/DL (ref 7–20)
CALCIUM SERPL-MCNC: 9.6 MG/DL (ref 8.3–10.6)
CHLORIDE BLD-SCNC: 98 MMOL/L (ref 99–110)
CO2: 24 MMOL/L (ref 21–32)
CREAT SERPL-MCNC: 0.8 MG/DL (ref 0.6–1.2)
GFR AFRICAN AMERICAN: >60
GFR NON-AFRICAN AMERICAN: >60
GLUCOSE BLD-MCNC: 97 MG/DL (ref 70–99)
POTASSIUM SERPL-SCNC: 4.5 MMOL/L (ref 3.5–5.1)
SODIUM BLD-SCNC: 133 MMOL/L (ref 136–145)

## 2018-09-12 PROCEDURE — 36415 COLL VENOUS BLD VENIPUNCTURE: CPT

## 2018-09-12 PROCEDURE — 80048 BASIC METABOLIC PNL TOTAL CA: CPT

## 2021-05-27 ENCOUNTER — APPOINTMENT (OUTPATIENT)
Dept: GENERAL RADIOLOGY | Age: 86
End: 2021-05-27
Payer: MEDICARE

## 2021-05-27 ENCOUNTER — APPOINTMENT (OUTPATIENT)
Dept: CT IMAGING | Age: 86
End: 2021-05-27
Payer: MEDICARE

## 2021-05-27 ENCOUNTER — HOSPITAL ENCOUNTER (EMERGENCY)
Age: 86
Discharge: HOME | End: 2021-05-27
Payer: MEDICARE

## 2021-05-27 VITALS
RESPIRATION RATE: 18 BRPM | OXYGEN SATURATION: 98 % | HEART RATE: 66 BPM | BODY MASS INDEX: 21.9 KG/M2 | DIASTOLIC BLOOD PRESSURE: 79 MMHG | TEMPERATURE: 98.3 F | WEIGHT: 144 LBS | SYSTOLIC BLOOD PRESSURE: 147 MMHG

## 2021-05-27 DIAGNOSIS — K62.5 RECTAL BLEEDING: ICD-10-CM

## 2021-05-27 DIAGNOSIS — K52.9 COLITIS: Primary | ICD-10-CM

## 2021-05-27 DIAGNOSIS — S32.512G: ICD-10-CM

## 2021-05-27 LAB
A/G RATIO: 1.3 (ref 1.1–2.2)
ABO/RH: NORMAL
ALBUMIN SERPL-MCNC: 3.8 G/DL (ref 3.4–5)
ALP BLD-CCNC: 139 U/L (ref 40–129)
ALT SERPL-CCNC: 15 U/L (ref 10–40)
ANION GAP SERPL CALCULATED.3IONS-SCNC: 9 MMOL/L (ref 3–16)
ANTIBODY SCREEN: NORMAL
AST SERPL-CCNC: 22 U/L (ref 15–37)
BACTERIA: ABNORMAL /HPF
BASOPHILS ABSOLUTE: 0 K/UL (ref 0–0.2)
BASOPHILS RELATIVE PERCENT: 0.5 %
BILIRUB SERPL-MCNC: 0.5 MG/DL (ref 0–1)
BILIRUBIN URINE: NEGATIVE
BLOOD, URINE: ABNORMAL
BUN BLDV-MCNC: 23 MG/DL (ref 7–20)
CALCIUM SERPL-MCNC: 9.3 MG/DL (ref 8.3–10.6)
CHLORIDE BLD-SCNC: 98 MMOL/L (ref 99–110)
CLARITY: CLEAR
CO2: 25 MMOL/L (ref 21–32)
COLOR: YELLOW
CREAT SERPL-MCNC: 1.2 MG/DL (ref 0.6–1.2)
EOSINOPHILS ABSOLUTE: 0.1 K/UL (ref 0–0.6)
EOSINOPHILS RELATIVE PERCENT: 0.6 %
EPITHELIAL CELLS, UA: ABNORMAL /HPF (ref 0–5)
GFR AFRICAN AMERICAN: 52
GFR NON-AFRICAN AMERICAN: 43
GLOBULIN: 2.9 G/DL
GLUCOSE BLD-MCNC: 83 MG/DL (ref 70–99)
GLUCOSE URINE: NEGATIVE MG/DL
HCT VFR BLD CALC: 35.5 % (ref 36–48)
HEMATOLOGY PATH CONSULT: YES
HEMOGLOBIN: 12 G/DL (ref 12–16)
KETONES, URINE: NEGATIVE MG/DL
LEUKOCYTE ESTERASE, URINE: NEGATIVE
LYMPHOCYTES ABSOLUTE: 1.2 K/UL (ref 1–5.1)
LYMPHOCYTES RELATIVE PERCENT: 12.6 %
MACROCYTES: ABNORMAL
MCH RBC QN AUTO: 38.3 PG (ref 26–34)
MCHC RBC AUTO-ENTMCNC: 34 G/DL (ref 31–36)
MCV RBC AUTO: 112.7 FL (ref 80–100)
MICROSCOPIC EXAMINATION: YES
MONOCYTES ABSOLUTE: 0.6 K/UL (ref 0–1.3)
MONOCYTES RELATIVE PERCENT: 6.8 %
NEUTROPHILS ABSOLUTE: 7.5 K/UL (ref 1.7–7.7)
NEUTROPHILS RELATIVE PERCENT: 79.5 %
NITRITE, URINE: NEGATIVE
PDW BLD-RTO: 13.7 % (ref 12.4–15.4)
PH UA: 6 (ref 5–8)
PLATELET # BLD: 524 K/UL (ref 135–450)
PLATELET SLIDE REVIEW: ABNORMAL
PMV BLD AUTO: 6.9 FL (ref 5–10.5)
POTASSIUM SERPL-SCNC: 4.4 MMOL/L (ref 3.5–5.1)
PROTEIN UA: NEGATIVE MG/DL
RBC # BLD: 3.15 M/UL (ref 4–5.2)
RBC UA: ABNORMAL /HPF (ref 0–4)
RENAL EPITHELIAL, UA: ABNORMAL /HPF (ref 0–1)
SLIDE REVIEW: ABNORMAL
SODIUM BLD-SCNC: 132 MMOL/L (ref 136–145)
SPECIFIC GRAVITY UA: 1.01 (ref 1–1.03)
TOTAL PROTEIN: 6.7 G/DL (ref 6.4–8.2)
URINE REFLEX TO CULTURE: ABNORMAL
URINE TYPE: ABNORMAL
UROBILINOGEN, URINE: 0.2 E.U./DL
WBC # BLD: 9.5 K/UL (ref 4–11)
WBC UA: ABNORMAL /HPF (ref 0–5)

## 2021-05-27 PROCEDURE — 96374 THER/PROPH/DIAG INJ IV PUSH: CPT

## 2021-05-27 PROCEDURE — 80053 COMPREHEN METABOLIC PANEL: CPT

## 2021-05-27 PROCEDURE — 86901 BLOOD TYPING SEROLOGIC RH(D): CPT

## 2021-05-27 PROCEDURE — 99283 EMERGENCY DEPT VISIT LOW MDM: CPT

## 2021-05-27 PROCEDURE — 74177 CT ABD & PELVIS W/CONTRAST: CPT

## 2021-05-27 PROCEDURE — 6360000004 HC RX CONTRAST MEDICATION: Performed by: PHYSICIAN ASSISTANT

## 2021-05-27 PROCEDURE — 6360000002 HC RX W HCPCS: Performed by: PHYSICIAN ASSISTANT

## 2021-05-27 PROCEDURE — 86850 RBC ANTIBODY SCREEN: CPT

## 2021-05-27 PROCEDURE — 85025 COMPLETE CBC W/AUTO DIFF WBC: CPT

## 2021-05-27 PROCEDURE — 81001 URINALYSIS AUTO W/SCOPE: CPT

## 2021-05-27 PROCEDURE — 2580000003 HC RX 258: Performed by: PHYSICIAN ASSISTANT

## 2021-05-27 PROCEDURE — 96375 TX/PRO/DX INJ NEW DRUG ADDON: CPT

## 2021-05-27 PROCEDURE — 73502 X-RAY EXAM HIP UNI 2-3 VIEWS: CPT

## 2021-05-27 PROCEDURE — 86900 BLOOD TYPING SEROLOGIC ABO: CPT

## 2021-05-27 RX ORDER — ONDANSETRON 2 MG/ML
4 INJECTION INTRAMUSCULAR; INTRAVENOUS ONCE
Status: COMPLETED | OUTPATIENT
Start: 2021-05-27 | End: 2021-05-27

## 2021-05-27 RX ORDER — DOCUSATE SODIUM 100 MG/1
100 CAPSULE, LIQUID FILLED ORAL 2 TIMES DAILY
Qty: 60 CAPSULE | Refills: 0 | Status: SHIPPED | OUTPATIENT
Start: 2021-05-27 | End: 2021-06-26

## 2021-05-27 RX ORDER — OXYCODONE HYDROCHLORIDE AND ACETAMINOPHEN 5; 325 MG/1; MG/1
1-2 TABLET ORAL EVERY 6 HOURS PRN
Qty: 10 TABLET | Refills: 0 | Status: SHIPPED | OUTPATIENT
Start: 2021-05-27 | End: 2021-06-03

## 2021-05-27 RX ORDER — MORPHINE SULFATE 2 MG/ML
2 INJECTION, SOLUTION INTRAMUSCULAR; INTRAVENOUS ONCE
Status: COMPLETED | OUTPATIENT
Start: 2021-05-27 | End: 2021-05-27

## 2021-05-27 RX ORDER — CIPROFLOXACIN 500 MG/1
250 TABLET, FILM COATED ORAL 2 TIMES DAILY
Qty: 14 TABLET | Refills: 0 | Status: SHIPPED | OUTPATIENT
Start: 2021-05-27 | End: 2021-06-03

## 2021-05-27 RX ORDER — METRONIDAZOLE 250 MG/1
250 TABLET ORAL 2 TIMES DAILY
Qty: 14 TABLET | Refills: 0 | Status: SHIPPED | OUTPATIENT
Start: 2021-05-27 | End: 2021-06-03

## 2021-05-27 RX ORDER — 0.9 % SODIUM CHLORIDE 0.9 %
1000 INTRAVENOUS SOLUTION INTRAVENOUS ONCE
Status: COMPLETED | OUTPATIENT
Start: 2021-05-27 | End: 2021-05-27

## 2021-05-27 RX ADMIN — MORPHINE SULFATE 2 MG: 2 INJECTION, SOLUTION INTRAMUSCULAR; INTRAVENOUS at 13:37

## 2021-05-27 RX ADMIN — ONDANSETRON 4 MG: 2 INJECTION INTRAMUSCULAR; INTRAVENOUS at 13:38

## 2021-05-27 RX ADMIN — IOPAMIDOL 75 ML: 755 INJECTION, SOLUTION INTRAVENOUS at 13:59

## 2021-05-27 RX ADMIN — SODIUM CHLORIDE 1000 ML: 9 INJECTION, SOLUTION INTRAVENOUS at 13:37

## 2021-05-27 ASSESSMENT — PAIN DESCRIPTION - LOCATION: LOCATION: ABDOMEN

## 2021-05-27 ASSESSMENT — PAIN DESCRIPTION - PAIN TYPE: TYPE: ACUTE PAIN

## 2021-05-27 ASSESSMENT — PAIN SCALES - GENERAL
PAINLEVEL_OUTOF10: 7
PAINLEVEL_OUTOF10: 7

## 2021-05-27 NOTE — ED PROVIDER NOTES
Systems here for evaluation of lower abdominal pain as well as concomitant diarrhea that had turned to bright red rectal bleeding as described above x1 to 2 days  At least 10 systemsreviewed and otherwise acutely negative except as in the [address] Past History     Past Medical History:   Diagnosis Date    Arthritis     Cancer ([facility] [address]     Cancer of blood vessel ([facility] [address]     Depression     DVT (deep venous thrombosis) (HCC)     Hypertension      Past Surgical History:   Procedure Laterality Date    CATARACT REMOVAL WITH IMPLANT Right 3/25/14    EYE SURGERY Left 5/1/14    cataract    HIP SURGERY Right 03/15/2017    TFN    HYSTERECTOMY       Social History     Socioeconomic History    Marital status:      Spouse name: None    Number of children: None    Years of education: None    Highest education level: None   Occupational History    Occupation: housewife   Tobacco Use    Smoking status: Never Smoker    Smokeless tobacco: Never Used   Substance and Sexual Activity    Alcohol use: No    Drug use: No    Sexual activity: Never   Other Topics Concern    None   Social History Narrative    None     Social Determinants of Health     Financial Resource Strain:     Difficulty of Paying Living Expenses:    Food Insecurity:     Worried About Running Out of Food in the Last Year:     [address] in the Last Year:    Transportation Needs:     Lack of Transportation (Medical):      Lack of Transportation (Non-Medical):    Physical Activity:     Days of Exercise per Week:     Minutes of Exercise per Session:    Stress:     Feeling of Stress :    Social Connections:     Frequency of Communication with Friends and Family:     Frequency of Social Gatherings with Friends and Family:     Attends Denominational Services:     Active Member of Clubs or Organizations:     Attends Club or Organization Meetings:     Marital Status:    Intimate Partner Violence:     Fear of Current or Ex-Partner:     Emotionally Abused:     Physically Abused:     Sexually Abused:        Medications/Allergies     Previous Medications    APIXABAN (ELIQUIS PO)    Take by mouth    GABAPENTIN (NEURONTIN) 600 MG TABLET    Take 600 mg by mouth 2 times daily. 1/2 tablet at noon.  1 tablet at night    HYDROXYUREA (HYDREA) 500 MG CHEMO CAPSULE    Take 500 mg by mouth daily     LISINOPRIL (PRINIVIL;ZESTRIL) 10 MG TABLET    Take 1 tablet by mouth daily    MECLIZINE (ANTIVERT) 25 MG TABLET    Take 25 mg by mouth 3 times daily as needed for Dizziness    TRAMADOL (ULTRAM) 50 MG TABLET    Take 50 mg by mouth every 6 hours as needed for Pain     No Known Allergies     Physical Exam       ED Triage Vitals [05/27/21 1219]   BP Temp Temp Source Pulse Resp SpO2 Height Weight   131/77 98.3 °F (36.8 °C) Oral 65 17 98 % -- 144 lb (65.3 kg)     Physical Exam  Normotensive non-tachycardic afebrile nontachypneic satting well on room air  Constitutional:  Well-nourished well-developed in no acute distress  Eyes:  PERRLA, EOMI x6, no nystagmus no photophobia  HENT:  Teeth and tongue intact, uvula midline, no PTA or retropharyngeal abscess noted no other intraoral lesion or edema appreciated patient tolerating secretions well, no stridor, no nuchal rigidity  Respiratory:  Clear to auscultation bilaterally, no crepitus or subcutaneous emphysema noted with palpation in the bilateral chest wall  Cardiovascular:  S1-S2, no S3  GI: Scaphoid abdomen soft minimally tender to palpation overlying the left lower quadrant of her abdomen and area overlying her bladder no other significant focal areas of tenderness noted no contralateral rebound tenderness noted no pulsatile or other masses noted negative Roblero sign  :  Exam deferred for now no subjective complaints  Musculoskeletal:  Distally neurovascularly intact 2+ pulses normal capillary refill gross sensorimotor intact ×4 extremities  Integument:  Skin is warm well perfused  Lymphatic:  No significant lymphadenopathy appreciated  Neurologic:  Alert and oriented ×3, cranial nerves II through XII grossly intact as tested, patient able to ambulate, no ataxia, cauda equina, saddle anesthesia or bowel or bladder incontinence  Psychiatric:  Mood, behavior, judgment all within normal limits      SCREENINGS           Diagnostics   Labs:  Results for orders placed or performed during the hospital encounter of 05/27/21   CBC Auto Differential   Result Value Ref Range    WBC 9.5 4.0 - 11.0 K/uL    RBC 3.15 (L) 4.00 - 5.20 M/uL    Hemoglobin 12.0 12.0 - 16.0 g/dL    Hematocrit 35.5 (L) 36.0 - 48.0 %    .7 (H) 80.0 - 100.0 fL    MCH 38.3 (H) 26.0 - 34.0 pg    MCHC 34.0 31.0 - 36.0 g/dL    RDW 13.7 12.4 - 15.4 %    Platelets 352 (H) 305 - 450 K/uL    MPV 6.9 5.0 - 10.5 fL    PLATELET SLIDE REVIEW Increased     SLIDE REVIEW see below     Path Consult Yes     Neutrophils % 79.5 %    Lymphocytes % 12.6 %    Monocytes % 6.8 %    Eosinophils % 0.6 %    Basophils % 0.5 %    Neutrophils Absolute 7.5 1.7 - 7.7 K/uL    Lymphocytes Absolute 1.2 1.0 - 5.1 K/uL    Monocytes Absolute 0.6 0.0 - 1.3 K/uL    Eosinophils Absolute 0.1 0.0 - 0.6 K/uL    Basophils Absolute 0.0 0.0 - 0.2 K/uL    Macrocytes 1+ (A)    Comprehensive metabolic panel   Result Value Ref Range    Sodium 132 (L) 136 - 145 mmol/L    Potassium 4.4 3.5 - 5.1 mmol/L    Chloride 98 (L) 99 - 110 mmol/L    CO2 25 21 - 32 mmol/L    Anion Gap 9 3 - 16    Glucose 83 70 - 99 mg/dL    BUN 23 (H) 7 - 20 mg/dL    CREATININE 1.2 0.6 - 1.2 mg/dL    GFR Non- 43 (A) >60    GFR  52 (A) >60    Calcium 9.3 8.3 - 10.6 mg/dL    Total Protein 6.7 6.4 - 8.2 g/dL    Albumin 3.8 3.4 - 5.0 g/dL    Albumin/Globulin Ratio 1.3 1.1 - 2.2    Total Bilirubin 0.5 0.0 - 1.0 mg/dL    Alkaline Phosphatase 139 (H) 40 - 129 U/L    ALT 15 10 - 40 U/L    AST 22 15 - 37 U/L    Globulin 2.9 g/dL   Urinalysis Reflex to Culture    Specimen: Urine voided   Result Value Ref Range Color, UA Yellow Straw/Yellow    Clarity, UA Clear Clear    Glucose, Ur Negative Negative mg/dL    Bilirubin Urine Negative Negative    Ketones, Urine Negative Negative mg/dL    Specific Gravity, UA 1.010 1.005 - 1.030    Blood, Urine MODERATE (A) Negative    pH, UA 6.0 5.0 - 8.0    Protein, UA Negative Negative mg/dL    Urobilinogen, Urine 0.2 <2.0 E.U./dL    Nitrite, Urine Negative Negative    Leukocyte Esterase, Urine Negative Negative    Microscopic Examination YES     Urine Type NotGiven     Urine Reflex to Culture Not Indicated    Microscopic Urinalysis   Result Value Ref Range    WBC, UA 3-5 0 - 5 /HPF    RBC, UA 3-4 0 - 4 /HPF    Epithelial Cells, UA 2-5 0 - 5 /HPF    Renal Epithelial, UA 0-1 0 - 1 /HPF    Bacteria, UA Rare (A) None Seen /HPF   TYPE AND SCREEN   Result Value Ref Range    ABO/Rh B POS     Antibody Screen NEG      Radiographs:  XR HIP LEFT (2-3 VIEWS)    Result Date: 5/27/2021  EXAMINATION: TWO XRAY VIEWS OF THE LEFT HIP 5/27/2021 1:26 pm COMPARISON: None. HISTORY: ORDERING SYSTEM PROVIDED HISTORY: Part of outpatient work-up per PCP TECHNOLOGIST PROVIDED HISTORY: Reason for exam:->Part of outpatient work-up per PCP Reason for Exam: Part of outpatient work-up per PCP Acuity: Acute Type of Exam: Initial FINDINGS: Postop changes of right hip ORIF are noted. Hardware transfixes a remote/chronic healed fracture. There is mild degenerative joint space narrowing within the left hip with rim-like acetabular and femoral neck osteophyte formation. There is no acute fracture or dislocation. Mild osteoarthritis. CT ABDOMEN PELVIS W IV CONTRAST Additional Contrast? None    Result Date: 5/27/2021  EXAMINATION: CT OF THE ABDOMEN AND PELVIS WITH CONTRAST 5/27/2021 1:47 pm TECHNIQUE: CT of the abdomen and pelvis was performed with the administration of intravenous contrast. Multiplanar reformatted images are provided for review.  Dose modulation, iterative reconstruction, and/or weight based adjustment of the mA/kV was utilized to reduce the radiation dose to as low as reasonably achievable. COMPARISON: None. HISTORY: ORDERING SYSTEM PROVIDED HISTORY: Lower abdominal pain with history of rectal bleeding TECHNOLOGIST PROVIDED HISTORY: Reason for exam:->Lower abdominal pain with history of rectal bleeding Additional Contrast?->None Decision Support Exception - unselect if not a suspected or confirmed emergency medical condition->Emergency Medical Condition (MA) Reason for Exam: lower abd pain X 4 weeks, had some rectal bleeding last nigh, pt states she was straining to have a bowel movement Acuity: Acute Type of Exam: Initial Additional signs and symptoms: prev hysterectomy FINDINGS: Lower Chest: There is bibasilar scarring. Organs: The spleen, adrenal glands and kidneys are unremarkable. There is mild intrahepatic ductal dilatation there is also mild pancreatic duct dilatation with a maximal dimension of 4 mm without evidence of obstructing lesion. GI/Bowel: There is no bowel obstruction. The appendix is dilated measuring 1.1 cm without evidence of periappendiceal inflammation, likely due to a normal variant. Scattered diverticula involve the sigmoid colon without adjacent inflammation. However, there is moderate continuous circumferential wall thickening of the descending colon with mild pericolonic inflammation due to acute colitis. Pelvis: Status post hysterectomy. The bladder is mildly distended with mild eccentric bladder wall thickening and moderate diverticula likely due to chronic bladder outlet obstruction. Peritoneum/Retroperitoneum: There is no evidence of free fluid or adenopathy. Moderate atherosclerosis involves the abdominal aorta and bilateral common iliac arteries. Bones/Soft Tissues: Degenerative changes involve the thoracolumbar spine and bilateral hips. The bones are demineralized. There is grade 1 anterolisthesis of L5 on S1 as well as moderate to severe dextroscoliosis.  An age-indeterminate compression fracture involves the superior endplate of L1. Status post right femoral intramedullary santos and dynamic pin. In addition, there are acute fractures involving the left superior pubic ramus and pubic root with a small left pectineus intramuscular hematoma. Age-indeterminate fractures involve the left sacral ala. Multiple expansile cystic lesions are present within the left S1 and bilateral S2 neural foramen likely due to perineural cysts. 1. Acute colitis involving the descending colon favoring infectious/inflammatory process. 2. Acute fractures of the left superior pubic ramus and pubic root. 3. Age-indeterminate fractures involving the left sacral ala and superior endplate of L1. If there is point tenderness, recommend nonemergent MRI of the lumbar spine. ED Course and MDM           In brief, Farhat Winslow is a 81 y.o. female who presented to the emergency department for evaluation of left lower quadrant abdominal pain and as well as concern over previously evaluated left hip and groin pain initiated with work-up in the outpatient setting by the patient's PCP Dr. Venita Herbert. I spoke with Dr. Murphy regarding the patient's results involving her CT abdomen pelvis which showed evidence of colitis of a likely infectious etiology. In addition it showed evidence of possible new left superior ramus fracture. Blood work and other chemistry results were unremarkable. Recommendations for non urgent MRI of the left hip were provided by the radiologist    After discussing the patient's clinical presentation with .   and it was agreed that the patient due to her high social support in the outpatient setting with her family who lives with her would be a good candidate for outpatient treatment to manage both the colitis as well as appropriate follow-up for her left superior ramus fracture    ED Medication Orders (From admission, onward)    Start Ordered     Status Ordering Provider    05/27/21 1358 05/27/21 1359  iopamidol (ISOVUE-370) 76 % injection 75 mL  IMG ONCE PRN      Last MAR action: Given - by Ryder Sol on 05/27/21 at [facility], YOU CESPEDES    05/27/21 1330 05/27/21 1319  0.9 % sodium chloride bolus  ONCE      Last MAR action: New Bag - by Marcin Jade on 05/27/21 at [facility] YOU S    05/27/21 1330 05/27/21 1319  ondansetron (ZOFRAN) injection 4 mg  ONCE      Last MAR action: Given - by Marcin Jade on 05/27/21 at [facility].    05/27/21 1330 05/27/21 1319  morphine (PF) injection 2 mg  ONCE      Last MAR action: Given - by Marcin Jade on 05/27/21 at [facility]          Final Impression      1. Colitis    2. Rectal bleeding    3. Closed fracture of left superior pubic ramus, with delayed healing, subsequent encounter        DISPOSITION Decision To Discharge 05/27/2021 03:21:22 PM     Patient seen independently with an Emergency Medicine attending available for supervision.     (Please note that portions of this note may have been completed with a voice recognition program. Efforts were made to edit the dictations but occasionally words aremis-transcribed.)    Geovanny Brock, 1923 [facility], [address] [place] [address]  05/27/21 [phone]

## 2021-05-28 LAB — HEMATOLOGY PATH CONSULT: NORMAL

## 2022-06-13 NOTE — ED NOTES
Called PCP consult @[phone]  Re: DC planning per [email or web address]  05/27/21 [phone]
Discharge instructions were reviewed with the patient and the patient verbalized understanding. Patient IV was removed with no complications. Patient stable and brought out of ED via wheelchair to head home with daughter.      Grecia Hamilton RN  05/27/21 [phone]
[phone] [place], [place] [id number]

## 2023-10-12 ENCOUNTER — APPOINTMENT (OUTPATIENT)
Dept: CT IMAGING | Age: 88
End: 2023-10-12
Payer: MEDICARE

## 2023-10-12 ENCOUNTER — APPOINTMENT (OUTPATIENT)
Dept: GENERAL RADIOLOGY | Age: 88
End: 2023-10-12
Payer: MEDICARE

## 2023-10-12 ENCOUNTER — HOSPITAL ENCOUNTER (EMERGENCY)
Age: 88
Discharge: HOME | End: 2023-10-12
Attending: EMERGENCY MEDICINE
Payer: MEDICARE

## 2023-10-12 VITALS
DIASTOLIC BLOOD PRESSURE: 76 MMHG | WEIGHT: 146 LBS | SYSTOLIC BLOOD PRESSURE: 128 MMHG | RESPIRATION RATE: 14 BRPM | BODY MASS INDEX: 22.91 KG/M2 | HEIGHT: 67 IN | HEART RATE: 69 BPM | OXYGEN SATURATION: 98 % | TEMPERATURE: 97.8 F

## 2023-10-12 DIAGNOSIS — I95.1 ORTHOSTASIS: Primary | ICD-10-CM

## 2023-10-12 DIAGNOSIS — R55 VASOVAGAL EPISODE: ICD-10-CM

## 2023-10-12 DIAGNOSIS — K52.9 COLITIS: ICD-10-CM

## 2023-10-12 LAB
ALBUMIN SERPL-MCNC: 3.7 G/DL (ref 3.4–5)
ALBUMIN/GLOB SERPL: 1.5 {RATIO} (ref 1.1–2.2)
ALP SERPL-CCNC: 46 U/L (ref 40–129)
ALT SERPL-CCNC: 10 U/L (ref 10–40)
ANION GAP SERPL CALCULATED.3IONS-SCNC: 10 MMOL/L (ref 3–16)
AST SERPL-CCNC: 27 U/L (ref 15–37)
BASOPHILS # BLD: 0 K/UL (ref 0–0.2)
BASOPHILS NFR BLD: 1 %
BILIRUB SERPL-MCNC: <0.2 MG/DL (ref 0–1)
BUN SERPL-MCNC: 17 MG/DL (ref 7–20)
CALCIUM SERPL-MCNC: 8.8 MG/DL (ref 8.3–10.6)
CHLORIDE SERPL-SCNC: 96 MMOL/L (ref 99–110)
CO2 SERPL-SCNC: 22 MMOL/L (ref 21–32)
CREAT SERPL-MCNC: 1.2 MG/DL (ref 0.6–1.2)
DEPRECATED RDW RBC AUTO: 14.2 % (ref 12.4–15.4)
EKG ATRIAL RATE: 69 BPM
EKG DIAGNOSIS: NORMAL
EKG P AXIS: 54 DEGREES
EKG P-R INTERVAL: 174 MS
EKG Q-T INTERVAL: 396 MS
EKG QRS DURATION: 78 MS
EKG QTC CALCULATION (BAZETT): 424 MS
EKG R AXIS: -16 DEGREES
EKG T AXIS: 55 DEGREES
EKG VENTRICULAR RATE: 69 BPM
EOSINOPHIL # BLD: 0.1 K/UL (ref 0–0.6)
EOSINOPHIL NFR BLD: 2.1 %
GFR SERPLBLD CREATININE-BSD FMLA CKD-EPI: 44 ML/MIN/{1.73_M2}
GLUCOSE SERPL-MCNC: 103 MG/DL (ref 70–99)
HCT VFR BLD AUTO: 31.8 % (ref 36–48)
HGB BLD-MCNC: 11 G/DL (ref 12–16)
LIPASE SERPL-CCNC: 72 U/L (ref 13–60)
LYMPHOCYTES # BLD: 1.5 K/UL (ref 1–5.1)
LYMPHOCYTES NFR BLD: 36.3 %
MCH RBC QN AUTO: 41.7 PG (ref 26–34)
MCHC RBC AUTO-ENTMCNC: 34.5 G/DL (ref 31–36)
MCV RBC AUTO: 120.9 FL (ref 80–100)
MONOCYTES # BLD: 0.3 K/UL (ref 0–1.3)
MONOCYTES NFR BLD: 6.9 %
NEUTROPHILS # BLD: 2.3 K/UL (ref 1.7–7.7)
NEUTROPHILS NFR BLD: 53.7 %
PLATELET # BLD AUTO: 349 K/UL (ref 135–450)
PMV BLD AUTO: 7.1 FL (ref 5–10.5)
POTASSIUM SERPL-SCNC: 4.9 MMOL/L (ref 3.5–5.1)
PROT SERPL-MCNC: 6.2 G/DL (ref 6.4–8.2)
RBC # BLD AUTO: 2.63 M/UL (ref 4–5.2)
SODIUM SERPL-SCNC: 128 MMOL/L (ref 136–145)
WBC # BLD AUTO: 4.3 K/UL (ref 4–11)

## 2023-10-12 PROCEDURE — 96360 HYDRATION IV INFUSION INIT: CPT

## 2023-10-12 PROCEDURE — 71045 X-RAY EXAM CHEST 1 VIEW: CPT

## 2023-10-12 PROCEDURE — 74177 CT ABD & PELVIS W/CONTRAST: CPT

## 2023-10-12 PROCEDURE — 96361 HYDRATE IV INFUSION ADD-ON: CPT

## 2023-10-12 PROCEDURE — 83690 ASSAY OF LIPASE: CPT

## 2023-10-12 PROCEDURE — 93010 ELECTROCARDIOGRAM REPORT: CPT | Performed by: INTERNAL MEDICINE

## 2023-10-12 PROCEDURE — 85025 COMPLETE CBC W/AUTO DIFF WBC: CPT

## 2023-10-12 PROCEDURE — 93005 ELECTROCARDIOGRAM TRACING: CPT | Performed by: EMERGENCY MEDICINE

## 2023-10-12 PROCEDURE — 2580000003 HC RX 258: Performed by: EMERGENCY MEDICINE

## 2023-10-12 PROCEDURE — 6360000004 HC RX CONTRAST MEDICATION: Performed by: EMERGENCY MEDICINE

## 2023-10-12 PROCEDURE — 70450 CT HEAD/BRAIN W/O DYE: CPT

## 2023-10-12 PROCEDURE — 6370000000 HC RX 637 (ALT 250 FOR IP): Performed by: EMERGENCY MEDICINE

## 2023-10-12 PROCEDURE — 99285 EMERGENCY DEPT VISIT HI MDM: CPT

## 2023-10-12 PROCEDURE — 80053 COMPREHEN METABOLIC PANEL: CPT

## 2023-10-12 PROCEDURE — 36415 COLL VENOUS BLD VENIPUNCTURE: CPT

## 2023-10-12 RX ORDER — AMLODIPINE BESYLATE 2.5 MG/1
1 TABLET ORAL NIGHTLY
COMMUNITY
Start: 2023-08-17

## 2023-10-12 RX ORDER — AMOXICILLIN AND CLAVULANATE POTASSIUM 875; 125 MG/1; MG/1
1 TABLET, FILM COATED ORAL 2 TIMES DAILY
Qty: 20 TABLET | Refills: 0 | Status: SHIPPED | OUTPATIENT
Start: 2023-10-12 | End: 2023-10-22

## 2023-10-12 RX ORDER — 0.9 % SODIUM CHLORIDE 0.9 %
500 INTRAVENOUS SOLUTION INTRAVENOUS ONCE
Status: COMPLETED | OUTPATIENT
Start: 2023-10-12 | End: 2023-10-12

## 2023-10-12 RX ORDER — ESCITALOPRAM OXALATE 5 MG/1
5 TABLET ORAL DAILY
COMMUNITY
Start: 2023-03-13

## 2023-10-12 RX ORDER — LISINOPRIL 40 MG/1
TABLET ORAL
COMMUNITY
Start: 2023-08-09

## 2023-10-12 RX ORDER — AMOXICILLIN AND CLAVULANATE POTASSIUM 875; 125 MG/1; MG/1
1 TABLET, FILM COATED ORAL EVERY 12 HOURS SCHEDULED
Status: DISCONTINUED | OUTPATIENT
Start: 2023-10-12 | End: 2023-10-12 | Stop reason: HOSPADM

## 2023-10-12 RX ORDER — VITAMIN C
1 TAB ORAL DAILY
COMMUNITY
Start: 2021-06-07

## 2023-10-12 RX ORDER — RANOLAZINE 500 MG/1
TABLET, EXTENDED RELEASE ORAL
COMMUNITY
Start: 2023-09-05

## 2023-10-12 RX ORDER — 0.9 % SODIUM CHLORIDE 0.9 %
1000 INTRAVENOUS SOLUTION INTRAVENOUS ONCE
Status: COMPLETED | OUTPATIENT
Start: 2023-10-12 | End: 2023-10-12

## 2023-10-12 RX ORDER — LANOLIN ALCOHOL/MO/W.PET/CERES
1000 CREAM (GRAM) TOPICAL
COMMUNITY

## 2023-10-12 RX ADMIN — SODIUM CHLORIDE 1000 ML: 9 INJECTION, SOLUTION INTRAVENOUS at 15:03

## 2023-10-12 RX ADMIN — AMOXICILLIN AND CLAVULANATE POTASSIUM 1 TABLET: 875; 125 TABLET, FILM COATED ORAL at 21:04

## 2023-10-12 RX ADMIN — SODIUM CHLORIDE 500 ML: 9 INJECTION, SOLUTION INTRAVENOUS at 17:48

## 2023-10-12 RX ADMIN — IOMEPROL INJECTION 75 ML: 714 INJECTION, SOLUTION INTRAVASCULAR at 15:47

## 2023-10-12 ASSESSMENT — PAIN - FUNCTIONAL ASSESSMENT
PAIN_FUNCTIONAL_ASSESSMENT: NONE - DENIES PAIN

## 2023-10-12 ASSESSMENT — LIFESTYLE VARIABLES
HOW OFTEN DO YOU HAVE A DRINK CONTAINING ALCOHOL: NEVER
HOW MANY STANDARD DRINKS CONTAINING ALCOHOL DO YOU HAVE ON A TYPICAL DAY: PATIENT DOES NOT DRINK

## 2023-10-12 NOTE — ED NOTES
Pt ambulating to restroom with daughter, pt tolerating well. Denies dizziness during ambulation.       Talon Sun RN  10/12/23 [phone]

## 2023-10-12 NOTE — ED PROVIDER NOTES
Dev thank you    Emergency Department Provider Note  Location: [facility] EMERGENCY DEPARTMENT  10/12/2023     Patient Identification  Jayda Encarnacion is a 81 y.o. female    Chief Complaint  Loss of Consciousness (EMS called for syncopal episode while on toilet. Pt states she went to restroom approx 3 times with difficulty, felt dizzy, and reports she passed out. Pt denies complaints during triage.   )          HPI  (History provided by patient)  Patient is an 81-year-old female with a history of essential thrombocytosis, DVTs on Eliquis hypertension who presents by EMS after syncopal episode. Patient reports that she had been having stomach cramps and had a few episodes of diarrhea since yesterday. Had been making trips to the toilet but unable to have a bowel movement. She felt lightheaded each time she stood up from a seated position as well as lightheaded when she was on the toilet. On her last trip to the toilet she felt lightheaded and apparently family member found her who is just outside and called EMS after she was noted to be unresponsive with a listless stare and was drooling at 1 point. There is no tonic-clonic seizure activity. Patient did not fall. Came back to for EMS and pleasant and asymptomatic on arrival here. I have reviewed the following nursing documentation:  Allergies: No Known Allergies    Past medical history:  has a past medical history of Arthritis, Cancer ([address]), Cancer of blood vessel ([address]), Depression, DVT (deep venous thrombosis) ([address]), and Hypertension. Past surgical history:  has a past surgical history that includes Hysterectomy; Cataract removal with implant (Right, 3/25/14); eye surgery (Left, 5/1/14); and hip surgery (Right, 03/15/2017). Home medications:   Prior to Admission medications    Medication Sig Start Date End Date Taking?  Authorizing Provider   amLODIPine (NORVASC) 2.5 MG tablet Take 1 tablet by mouth nightly 8/17/23  Yes Provider, MD Carlos

## 2023-10-13 NOTE — ED NOTES
Discharge instructions and medications reviewed with patient. Patient verbalized understanding of medications and follow up. All questions answered at this time. IV removed, dressing applied, and bleeding controlled. Skin warm, pink, and dry. Patient alert and oriented x4. Pt ambulated to lobby with a stable gait. Patient discharged home with 1 prescriptions.        Veronica Glasgow RN  10/12/23 [phone]

## 2024-05-22 ENCOUNTER — HOSPITAL ENCOUNTER (EMERGENCY)
Age: 89
Discharge: HOME | End: 2024-05-22
Payer: MEDICARE

## 2024-05-22 ENCOUNTER — APPOINTMENT (OUTPATIENT)
Dept: GENERAL RADIOLOGY | Age: 89
End: 2024-05-22
Payer: MEDICARE

## 2024-05-22 VITALS
WEIGHT: 138 LBS | SYSTOLIC BLOOD PRESSURE: 140 MMHG | RESPIRATION RATE: 17 BRPM | OXYGEN SATURATION: 97 % | HEART RATE: 72 BPM | HEIGHT: 67 IN | TEMPERATURE: 97.4 F | DIASTOLIC BLOOD PRESSURE: 84 MMHG | BODY MASS INDEX: 21.66 KG/M2

## 2024-05-22 DIAGNOSIS — R19.7 DIARRHEA, UNSPECIFIED TYPE: Primary | ICD-10-CM

## 2024-05-22 LAB
ALBUMIN SERPL-MCNC: 3.8 G/DL (ref 3.4–5)
ALBUMIN/GLOB SERPL: 1.5 {RATIO} (ref 1.1–2.2)
ALP SERPL-CCNC: 184 U/L (ref 40–129)
ALT SERPL-CCNC: 9 U/L (ref 10–40)
ANION GAP SERPL CALCULATED.3IONS-SCNC: 11 MMOL/L (ref 3–16)
ANISOCYTOSIS BLD QL SMEAR: ABNORMAL
AST SERPL-CCNC: 19 U/L (ref 15–37)
BASOPHILS # BLD: 0 K/UL (ref 0–0.2)
BASOPHILS NFR BLD: 0.7 %
BILIRUB SERPL-MCNC: 0.4 MG/DL (ref 0–1)
BUN SERPL-MCNC: 6 MG/DL (ref 7–20)
C DIFF TOX A+B STL QL IA: NORMAL
CALCIUM SERPL-MCNC: 8.8 MG/DL (ref 8.3–10.6)
CHLORIDE SERPL-SCNC: 101 MMOL/L (ref 99–110)
CO2 SERPL-SCNC: 21 MMOL/L (ref 21–32)
CREAT SERPL-MCNC: 0.6 MG/DL (ref 0.6–1.2)
DEPRECATED RDW RBC AUTO: 16.8 % (ref 12.4–15.4)
EOSINOPHIL # BLD: 0 K/UL (ref 0–0.6)
EOSINOPHIL NFR BLD: 0.3 %
GFR SERPLBLD CREATININE-BSD FMLA CKD-EPI: 86 ML/MIN/{1.73_M2}
GLUCOSE SERPL-MCNC: 91 MG/DL (ref 70–99)
HCT VFR BLD AUTO: 32.1 % (ref 36–48)
HGB BLD-MCNC: 11.2 G/DL (ref 12–16)
LYMPHOCYTES # BLD: 0.9 K/UL (ref 1–5.1)
LYMPHOCYTES NFR BLD: 19.7 %
MACROCYTES BLD QL SMEAR: ABNORMAL
MCH RBC QN AUTO: 42.4 PG (ref 26–34)
MCHC RBC AUTO-ENTMCNC: 34.7 G/DL (ref 31–36)
MCV RBC AUTO: 122 FL (ref 80–100)
MICROCYTES BLD QL SMEAR: ABNORMAL
MONOCYTES # BLD: 0.6 K/UL (ref 0–1.3)
MONOCYTES NFR BLD: 13.3 %
NEUTROPHILS # BLD: 2.9 K/UL (ref 1.7–7.7)
NEUTROPHILS NFR BLD: 66 %
PATH INTERP BLD-IMP: YES
PLATELET # BLD AUTO: 477 K/UL (ref 135–450)
PLATELET BLD QL SMEAR: ABNORMAL
PMV BLD AUTO: 6.8 FL (ref 5–10.5)
POTASSIUM SERPL-SCNC: 3.6 MMOL/L (ref 3.5–5.1)
PROT SERPL-MCNC: 6.4 G/DL (ref 6.4–8.2)
RBC # BLD AUTO: 2.63 M/UL (ref 4–5.2)
SCHISTOCYTES BLD QL SMEAR: ABNORMAL
SLIDE REVIEW: ABNORMAL
SODIUM SERPL-SCNC: 133 MMOL/L (ref 136–145)
WBC # BLD AUTO: 4.3 K/UL (ref 4–11)

## 2024-05-22 PROCEDURE — 36415 COLL VENOUS BLD VENIPUNCTURE: CPT

## 2024-05-22 PROCEDURE — 87449 NOS EACH ORGANISM AG IA: CPT

## 2024-05-22 PROCEDURE — 87324 CLOSTRIDIUM AG IA: CPT

## 2024-05-22 PROCEDURE — 87425 ROTAVIRUS AG IA: CPT

## 2024-05-22 PROCEDURE — 2580000003 HC RX 258: Performed by: NURSE PRACTITIONER

## 2024-05-22 PROCEDURE — 99284 EMERGENCY DEPT VISIT MOD MDM: CPT

## 2024-05-22 PROCEDURE — 87506 IADNA-DNA/RNA PROBE TQ 6-11: CPT

## 2024-05-22 PROCEDURE — 80053 COMPREHEN METABOLIC PANEL: CPT

## 2024-05-22 PROCEDURE — 85025 COMPLETE CBC W/AUTO DIFF WBC: CPT

## 2024-05-22 PROCEDURE — 73502 X-RAY EXAM HIP UNI 2-3 VIEWS: CPT

## 2024-05-22 RX ORDER — 0.9 % SODIUM CHLORIDE 0.9 %
1000 INTRAVENOUS SOLUTION INTRAVENOUS ONCE
Status: COMPLETED | OUTPATIENT
Start: 2024-05-22 | End: 2024-05-22

## 2024-05-22 RX ADMIN — SODIUM CHLORIDE 1000 ML: 9 INJECTION, SOLUTION INTRAVENOUS at 13:39

## 2024-05-22 ASSESSMENT — PAIN - FUNCTIONAL ASSESSMENT: PAIN_FUNCTIONAL_ASSESSMENT: NONE - DENIES PAIN

## 2024-05-22 ASSESSMENT — LIFESTYLE VARIABLES
HOW MANY STANDARD DRINKS CONTAINING ALCOHOL DO YOU HAVE ON A TYPICAL DAY: PATIENT DOES NOT DRINK
HOW OFTEN DO YOU HAVE A DRINK CONTAINING ALCOHOL: NEVER

## 2024-05-22 NOTE — ED NOTES
Got a hold of they after call hrs they said they will not keep paging the doctor will will have to call back after 5 pm

## 2024-05-23 LAB
G LAMBLIA AG STL QL IA: NORMAL
GI PATHOGENS PNL STL NAA+PROBE: NORMAL

## 2024-05-24 LAB
PATH INTERP BLD-IMP: NORMAL
RV AG STL QL IA: NEGATIVE

## 2024-05-28 NOTE — ED PROVIDER NOTES
[facility] ED  EMERGENCY DEPARTMENT ENCOUNTER        Pt Name: Keri Kraus  MRN: [id number]  Birthdate 1935  Date of evaluation: 5/22/2024  Provider: JAMES Carvalho - ELISA  PCP: Stephen Sorto MD  Note Started: 9:02 AM EDT 5/28/24      MARIA. I have evaluated this patient.        CHIEF COMPLAINT       Chief Complaint   Patient presents with    Post-op Problem     Pt had recent left hip surgery and has been on pcn, states it is giving her diarrhea, and feels like her hip is infected.        HISTORY OF PRESENT ILLNESS: 1 or more Elements     History From: Patient     Limitations to history : None    Social Determinants Significantly Affecting Health : None    Chief Complaint: Diarrhea     Keri Kraus is a 88 y.o. female who presents to the emergency department today with symptoms of diarrhea.  Patient states that she been having diarrhea ever since she started penicillin for small localized skin infection involving her left hip.  States she had a recent hip surgery at [facility].  States that she denies really any particular pain within the hip.  States she has full range of motion without any difficulty.  The surgical incision site is healed but has some surrounding redness.  States that is why she is on the antibiotic.  States that she also wants a second opinion to be sure her hip is not infected.  In the meantime she denies any fevers.  Denies any recurrent illness except for this diarrhea that started when she started the penicillin.  States that her diarrhea is reported as loose/watery.  No evidence of black appearing stool or blood in stool.    Nursing Notes were all reviewed and agreed with or any disagreements were addressed in the HPI.    REVIEW OF SYSTEMS :      Review of Systems    Positives and Pertinent negatives as per HPI.     SURGICAL HISTORY     Past Surgical History:   Procedure Laterality Date    CATARACT REMOVAL WITH IMPLANT Right 3/25/14    EYE SURGERY